# Patient Record
Sex: FEMALE | Race: WHITE
[De-identification: names, ages, dates, MRNs, and addresses within clinical notes are randomized per-mention and may not be internally consistent; named-entity substitution may affect disease eponyms.]

---

## 2019-03-29 ENCOUNTER — HOSPITAL ENCOUNTER (OUTPATIENT)
Dept: HOSPITAL 100 - ED | Age: 24
Setting detail: OBSERVATION
LOS: 3 days | Discharge: HOME | End: 2019-04-01
Payer: MEDICARE

## 2019-03-29 VITALS
HEART RATE: 118 BPM | DIASTOLIC BLOOD PRESSURE: 99 MMHG | RESPIRATION RATE: 18 BRPM | OXYGEN SATURATION: 98 % | TEMPERATURE: 100.58 F | SYSTOLIC BLOOD PRESSURE: 148 MMHG

## 2019-03-29 VITALS
TEMPERATURE: 99.6 F | SYSTOLIC BLOOD PRESSURE: 148 MMHG | DIASTOLIC BLOOD PRESSURE: 94 MMHG | OXYGEN SATURATION: 94 % | HEART RATE: 126 BPM | RESPIRATION RATE: 24 BRPM

## 2019-03-29 VITALS
HEART RATE: 136 BPM | SYSTOLIC BLOOD PRESSURE: 120 MMHG | OXYGEN SATURATION: 100 % | RESPIRATION RATE: 23 BRPM | DIASTOLIC BLOOD PRESSURE: 81 MMHG

## 2019-03-29 VITALS — OXYGEN SATURATION: 97 %

## 2019-03-29 VITALS
DIASTOLIC BLOOD PRESSURE: 81 MMHG | RESPIRATION RATE: 22 BRPM | SYSTOLIC BLOOD PRESSURE: 120 MMHG | OXYGEN SATURATION: 100 % | HEART RATE: 133 BPM | TEMPERATURE: 98.4 F

## 2019-03-29 VITALS — BODY MASS INDEX: 33.8 KG/M2

## 2019-03-29 VITALS — HEART RATE: 135 BPM | RESPIRATION RATE: 20 BRPM

## 2019-03-29 DIAGNOSIS — R06.89: ICD-10-CM

## 2019-03-29 DIAGNOSIS — J10.1: Primary | ICD-10-CM

## 2019-03-29 DIAGNOSIS — Z79.899: ICD-10-CM

## 2019-03-29 DIAGNOSIS — Q90.9: ICD-10-CM

## 2019-03-29 DIAGNOSIS — E03.9: ICD-10-CM

## 2019-03-29 LAB
ALANINE AMINOTRANSFER ALT/SGPT: 20 U/L (ref 13–56)
ALBUMIN SERPL-MCNC: 2.9 G/DL (ref 3.2–5)
ALKALINE PHOSPHATASE: 65 U/L (ref 45–117)
ANION GAP: 3 (ref 5–15)
APTT PPP: 39.3 SECONDS (ref 24.1–36.2)
AST(SGOT): 21 U/L (ref 15–37)
BUN SERPL-MCNC: 14 MG/DL (ref 7–18)
BUN/CREAT RATIO: 15 RATIO (ref 10–20)
CALCIUM SERPL-MCNC: 8.5 MG/DL (ref 8.5–10.1)
CARBON DIOXIDE: 29 MMOL/L (ref 21–32)
CHLORIDE: 112 MMOL/L (ref 98–107)
DEPRECATED RDW RBC: 52.9 FL (ref 35.1–43.9)
DIFFERENTIAL INDICATED: (no result)
ERYTHROCYTE [DISTWIDTH] IN BLOOD: 14.8 % (ref 11.6–14.6)
EST GLOM FILT RATE - AFR AMER: 93 ML/MIN (ref 60–?)
ESTIMATED CREATININE CLEARANCE: 122.09 ML/MIN
GLOBULIN: 4.6 G/DL (ref 2.2–4.2)
GLUCOSE: 112 MG/DL (ref 74–106)
HCT VFR BLD AUTO: 43 % (ref 37–47)
HEMOGLOBIN: 14.1 G/DL (ref 12–15)
HGB BLD-MCNC: 14.1 G/DL (ref 12–15)
IMMATURE GRANULOCYTES COUNT: 0.01 X10^3/UL (ref 0–0)
MCV RBC: 97.3 FL (ref 81–99)
MEAN CORP HGB CONC: 32.8 G/GL (ref 32–36)
MEAN PLATELET VOL.: 10 FL (ref 6.2–12)
PLATELET # BLD: 228 K/MM3 (ref 150–450)
PLATELET COUNT: 228 K/MM3 (ref 150–450)
POSITIVE COUNT: NO
POSITIVE DIFFERENTIAL: NO
POSITIVE MORPHOLOGY: NO
POTASSIUM: 3.9 MMOL/L (ref 3.5–5.1)
PROTHROMBIN TIME (PROTIME)PT.: 14.5 SECONDS (ref 11.7–14.9)
RBC # BLD AUTO: 4.42 M/MM3 (ref 4.2–5.4)
RBC DISTRIBUTION WIDTH CV: 14.8 % (ref 11.6–14.6)
RBC DISTRIBUTION WIDTH SD: 52.9 FL (ref 35.1–43.9)
WBC # BLD AUTO: 4 K/MM3 (ref 4.4–11)
WHITE BLOOD COUNT: 4 K/MM3 (ref 4.4–11)

## 2019-03-29 PROCEDURE — 80048 BASIC METABOLIC PNL TOTAL CA: CPT

## 2019-03-29 PROCEDURE — 85025 COMPLETE CBC W/AUTO DIFF WBC: CPT

## 2019-03-29 PROCEDURE — 87804 INFLUENZA ASSAY W/OPTIC: CPT

## 2019-03-29 PROCEDURE — 71046 X-RAY EXAM CHEST 2 VIEWS: CPT

## 2019-03-29 PROCEDURE — A4216 STERILE WATER/SALINE, 10 ML: HCPCS

## 2019-03-29 PROCEDURE — 85610 PROTHROMBIN TIME: CPT

## 2019-03-29 PROCEDURE — 94640 AIRWAY INHALATION TREATMENT: CPT

## 2019-03-29 PROCEDURE — 99285 EMERGENCY DEPT VISIT HI MDM: CPT

## 2019-03-29 PROCEDURE — 83605 ASSAY OF LACTIC ACID: CPT

## 2019-03-29 PROCEDURE — 85730 THROMBOPLASTIN TIME PARTIAL: CPT

## 2019-03-29 PROCEDURE — 96361 HYDRATE IV INFUSION ADD-ON: CPT

## 2019-03-29 PROCEDURE — G0378 HOSPITAL OBSERVATION PER HR: HCPCS

## 2019-03-29 PROCEDURE — 80053 COMPREHEN METABOLIC PANEL: CPT

## 2019-03-29 PROCEDURE — 87880 STREP A ASSAY W/OPTIC: CPT

## 2019-03-29 PROCEDURE — 87040 BLOOD CULTURE FOR BACTERIA: CPT

## 2019-03-29 PROCEDURE — 96360 HYDRATION IV INFUSION INIT: CPT

## 2019-03-29 PROCEDURE — 99218: CPT

## 2019-03-29 RX ADMIN — SODIUM CHLORIDE 999 ML: 9 INJECTION, SOLUTION INTRAVENOUS at 22:57

## 2019-03-30 VITALS
OXYGEN SATURATION: 97 % | TEMPERATURE: 97.88 F | DIASTOLIC BLOOD PRESSURE: 78 MMHG | SYSTOLIC BLOOD PRESSURE: 126 MMHG | HEART RATE: 105 BPM | RESPIRATION RATE: 20 BRPM

## 2019-03-30 VITALS
DIASTOLIC BLOOD PRESSURE: 91 MMHG | RESPIRATION RATE: 17 BRPM | OXYGEN SATURATION: 94 % | SYSTOLIC BLOOD PRESSURE: 127 MMHG | HEART RATE: 127 BPM | TEMPERATURE: 98.1 F

## 2019-03-30 VITALS
RESPIRATION RATE: 18 BRPM | OXYGEN SATURATION: 99 % | TEMPERATURE: 99.1 F | HEART RATE: 116 BPM | DIASTOLIC BLOOD PRESSURE: 70 MMHG | SYSTOLIC BLOOD PRESSURE: 102 MMHG

## 2019-03-30 VITALS
DIASTOLIC BLOOD PRESSURE: 75 MMHG | HEART RATE: 111 BPM | RESPIRATION RATE: 20 BRPM | OXYGEN SATURATION: 94 % | TEMPERATURE: 99.9 F | SYSTOLIC BLOOD PRESSURE: 145 MMHG

## 2019-03-30 VITALS
SYSTOLIC BLOOD PRESSURE: 117 MMHG | DIASTOLIC BLOOD PRESSURE: 87 MMHG | HEART RATE: 91 BPM | OXYGEN SATURATION: 98 % | TEMPERATURE: 98 F | RESPIRATION RATE: 20 BRPM

## 2019-03-30 VITALS
RESPIRATION RATE: 28 BRPM | OXYGEN SATURATION: 92 % | HEART RATE: 124 BPM | SYSTOLIC BLOOD PRESSURE: 121 MMHG | DIASTOLIC BLOOD PRESSURE: 71 MMHG

## 2019-03-30 VITALS
SYSTOLIC BLOOD PRESSURE: 113 MMHG | DIASTOLIC BLOOD PRESSURE: 58 MMHG | HEART RATE: 126 BPM | OXYGEN SATURATION: 96 % | RESPIRATION RATE: 19 BRPM | TEMPERATURE: 98.06 F

## 2019-03-30 VITALS — OXYGEN SATURATION: 94 % | RESPIRATION RATE: 20 BRPM

## 2019-03-30 VITALS
TEMPERATURE: 98.06 F | SYSTOLIC BLOOD PRESSURE: 113 MMHG | RESPIRATION RATE: 14 BRPM | OXYGEN SATURATION: 98 % | DIASTOLIC BLOOD PRESSURE: 58 MMHG | HEART RATE: 129 BPM

## 2019-03-30 VITALS — OXYGEN SATURATION: 98 % | RESPIRATION RATE: 20 BRPM

## 2019-03-30 VITALS — OXYGEN SATURATION: 92 %

## 2019-03-30 VITALS — TEMPERATURE: 98.06 F

## 2019-03-30 VITALS — RESPIRATION RATE: 21 BRPM | HEART RATE: 124 BPM

## 2019-03-30 VITALS — OXYGEN SATURATION: 96 %

## 2019-03-30 VITALS — RESPIRATION RATE: 20 BRPM

## 2019-03-30 VITALS — OXYGEN SATURATION: 99 %

## 2019-03-30 VITALS — TEMPERATURE: 101.2 F

## 2019-03-30 RX ADMIN — ALBUTEROL SULFATE 2.5 MG: 2.5 SOLUTION RESPIRATORY (INHALATION) at 01:41

## 2019-03-30 RX ADMIN — SODIUM CHLORIDE 100 ML: 9 INJECTION, SOLUTION INTRAVENOUS at 12:29

## 2019-03-30 RX ADMIN — SODIUM CHLORIDE 100 ML: 9 INJECTION, SOLUTION INTRAVENOUS at 03:26

## 2019-03-30 RX ADMIN — SODIUM CHLORIDE 100 ML: 9 INJECTION, SOLUTION INTRAVENOUS at 21:04

## 2019-03-30 RX ADMIN — OSELTAMIVIR PHOSPHATE 75 MG: 6 POWDER, FOR SUSPENSION ORAL at 01:02

## 2019-03-31 VITALS
TEMPERATURE: 98.3 F | SYSTOLIC BLOOD PRESSURE: 103 MMHG | OXYGEN SATURATION: 94 % | HEART RATE: 103 BPM | RESPIRATION RATE: 18 BRPM | DIASTOLIC BLOOD PRESSURE: 61 MMHG

## 2019-03-31 VITALS
OXYGEN SATURATION: 94 % | HEART RATE: 118 BPM | SYSTOLIC BLOOD PRESSURE: 119 MMHG | TEMPERATURE: 99.8 F | RESPIRATION RATE: 20 BRPM | DIASTOLIC BLOOD PRESSURE: 70 MMHG

## 2019-03-31 VITALS
TEMPERATURE: 98.7 F | SYSTOLIC BLOOD PRESSURE: 102 MMHG | HEART RATE: 88 BPM | RESPIRATION RATE: 18 BRPM | DIASTOLIC BLOOD PRESSURE: 61 MMHG | OXYGEN SATURATION: 97 %

## 2019-03-31 VITALS
HEART RATE: 98 BPM | DIASTOLIC BLOOD PRESSURE: 55 MMHG | TEMPERATURE: 97.88 F | RESPIRATION RATE: 18 BRPM | OXYGEN SATURATION: 94 % | SYSTOLIC BLOOD PRESSURE: 121 MMHG

## 2019-03-31 VITALS — TEMPERATURE: 100.7 F

## 2019-03-31 VITALS — OXYGEN SATURATION: 94 %

## 2019-03-31 LAB
ANION GAP: 3 (ref 5–15)
BUN SERPL-MCNC: 11 MG/DL (ref 7–18)
BUN/CREAT RATIO: 14.8 RATIO (ref 10–20)
CALCIUM SERPL-MCNC: 8 MG/DL (ref 8.5–10.1)
CARBON DIOXIDE: 27 MMOL/L (ref 21–32)
CHLORIDE: 108 MMOL/L (ref 98–107)
DEPRECATED RDW RBC: 53.1 FL (ref 35.1–43.9)
DIFFERENTIAL INDICATED: (no result)
ERYTHROCYTE [DISTWIDTH] IN BLOOD: 14.9 % (ref 11.6–14.6)
EST GLOM FILT RATE - AFR AMER: 122 ML/MIN (ref 60–?)
ESTIMATED CREATININE CLEARANCE: 95.3 ML/MIN
GLUCOSE: 92 MG/DL (ref 74–106)
HCT VFR BLD AUTO: 42.3 % (ref 37–47)
HEMOGLOBIN: 13.8 G/DL (ref 12–15)
HGB BLD-MCNC: 13.8 G/DL (ref 12–15)
IMMATURE GRANULOCYTES COUNT: 0.01 X10^3/UL (ref 0–0)
MCV RBC: 96.6 FL (ref 81–99)
MEAN CORP HGB CONC: 32.6 G/GL (ref 32–36)
MEAN PLATELET VOL.: 10.2 FL (ref 6.2–12)
PLATELET # BLD: 220 K/MM3 (ref 150–450)
PLATELET COUNT: 220 K/MM3 (ref 150–450)
POSITIVE COUNT: NO
POSITIVE DIFFERENTIAL: NO
POSITIVE MORPHOLOGY: NO
POTASSIUM: 3.9 MMOL/L (ref 3.5–5.1)
RBC # BLD AUTO: 4.38 M/MM3 (ref 4.2–5.4)
RBC DISTRIBUTION WIDTH CV: 14.9 % (ref 11.6–14.6)
RBC DISTRIBUTION WIDTH SD: 53.1 FL (ref 35.1–43.9)
WBC # BLD AUTO: 6.1 K/MM3 (ref 4.4–11)
WHITE BLOOD COUNT: 6.1 K/MM3 (ref 4.4–11)

## 2019-03-31 RX ADMIN — SODIUM CHLORIDE 100 ML: 9 INJECTION, SOLUTION INTRAVENOUS at 18:29

## 2019-03-31 RX ADMIN — SODIUM CHLORIDE, PRESERVATIVE FREE 0 ML: 5 INJECTION INTRAVENOUS at 08:10

## 2019-03-31 RX ADMIN — SODIUM CHLORIDE 100 ML: 9 INJECTION, SOLUTION INTRAVENOUS at 08:09

## 2019-04-01 VITALS
DIASTOLIC BLOOD PRESSURE: 71 MMHG | SYSTOLIC BLOOD PRESSURE: 98 MMHG | HEART RATE: 93 BPM | OXYGEN SATURATION: 99 % | RESPIRATION RATE: 18 BRPM | TEMPERATURE: 98.1 F

## 2019-04-01 VITALS
HEART RATE: 92 BPM | RESPIRATION RATE: 20 BRPM | DIASTOLIC BLOOD PRESSURE: 71 MMHG | TEMPERATURE: 98.24 F | OXYGEN SATURATION: 98 % | SYSTOLIC BLOOD PRESSURE: 111 MMHG

## 2019-04-01 VITALS
SYSTOLIC BLOOD PRESSURE: 106 MMHG | OXYGEN SATURATION: 98 % | TEMPERATURE: 98.4 F | HEART RATE: 94 BPM | RESPIRATION RATE: 16 BRPM | DIASTOLIC BLOOD PRESSURE: 65 MMHG

## 2019-04-01 RX ADMIN — SODIUM CHLORIDE 100 ML: 9 INJECTION, SOLUTION INTRAVENOUS at 04:49

## 2020-08-11 ENCOUNTER — HOSPITAL ENCOUNTER (OUTPATIENT)
Age: 25
End: 2020-08-11
Payer: MEDICARE

## 2020-08-11 VITALS — BODY MASS INDEX: 33.8 KG/M2

## 2020-08-11 DIAGNOSIS — M53.2X1: Primary | ICD-10-CM

## 2020-08-11 PROCEDURE — 72050 X-RAY EXAM NECK SPINE 4/5VWS: CPT

## 2022-03-08 ENCOUNTER — HOSPITAL ENCOUNTER (OUTPATIENT)
Dept: HOSPITAL 100 - BIMLAB | Age: 27
Discharge: HOME | End: 2022-03-08
Payer: MEDICARE

## 2022-03-08 DIAGNOSIS — E28.2: ICD-10-CM

## 2022-03-08 DIAGNOSIS — E06.3: ICD-10-CM

## 2022-03-08 DIAGNOSIS — R50.9: Primary | ICD-10-CM

## 2022-03-08 DIAGNOSIS — E03.8: ICD-10-CM

## 2022-03-08 DIAGNOSIS — E55.9: ICD-10-CM

## 2022-03-08 LAB
ALANINE AMINOTRANSFER ALT/SGPT: 19 U/L (ref 13–56)
ALBUMIN SERPL-MCNC: 3 G/DL (ref 3.2–5)
ALKALINE PHOSPHATASE: 88 U/L (ref 45–117)
ANION GAP: 6 (ref 5–15)
AST(SGOT): 12 U/L (ref 15–37)
BUN SERPL-MCNC: 13 MG/DL (ref 7–18)
BUN/CREAT RATIO: 18 RATIO (ref 10–20)
CALCIUM SERPL-MCNC: 8.8 MG/DL (ref 8.5–10.1)
CARBON DIOXIDE: 27 MMOL/L (ref 21–32)
CHLORIDE: 104 MMOL/L (ref 98–107)
CHOLEST SERPL-MCNC: 160 MG/DL
EST GLOM FILT RATE - AFR AMER: 123 ML/MIN (ref 60–?)
GLOBULIN: 4.8 G/DL (ref 2.2–4.2)
GLUCOSE: 100 MG/DL (ref 74–106)
POTASSIUM: 4.3 MMOL/L (ref 3.5–5.1)
TRIGLYCERIDES: 96 MG/DL
VITAMIN D,25 HYDROXY: 21.1 NG/ML
VLDLC SERPL-MCNC: 19 MG/DL (ref 5–40)

## 2022-03-08 PROCEDURE — 80053 COMPREHEN METABOLIC PANEL: CPT

## 2022-03-08 PROCEDURE — 82306 VITAMIN D 25 HYDROXY: CPT

## 2022-03-08 PROCEDURE — 84443 ASSAY THYROID STIM HORMONE: CPT

## 2022-03-08 PROCEDURE — 80061 LIPID PANEL: CPT

## 2022-03-08 PROCEDURE — 36415 COLL VENOUS BLD VENIPUNCTURE: CPT

## 2022-03-08 PROCEDURE — 84439 ASSAY OF FREE THYROXINE: CPT

## 2023-03-02 ENCOUNTER — HOSPITAL ENCOUNTER (OUTPATIENT)
Age: 28
Discharge: HOME | End: 2023-03-02
Payer: MEDICARE

## 2023-03-02 DIAGNOSIS — E28.2: ICD-10-CM

## 2023-03-02 DIAGNOSIS — E55.9: ICD-10-CM

## 2023-03-02 DIAGNOSIS — R50.9: ICD-10-CM

## 2023-03-02 DIAGNOSIS — E06.3: ICD-10-CM

## 2023-03-02 DIAGNOSIS — E03.8: Primary | ICD-10-CM

## 2023-03-02 LAB
ALANINE AMINOTRANSFER ALT/SGPT: 20 U/L (ref 13–56)
ALBUMIN SERPL-MCNC: 3.1 G/DL (ref 3.2–5)
ALKALINE PHOSPHATASE: 70 U/L (ref 45–117)
ANION GAP: 5 (ref 5–15)
AST(SGOT): 16 U/L (ref 15–37)
BUN SERPL-MCNC: 16 MG/DL (ref 7–18)
BUN/CREAT RATIO: 20.1 RATIO (ref 10–20)
CALCIUM SERPL-MCNC: 9.2 MG/DL (ref 8.5–10.1)
CARBON DIOXIDE: 31 MMOL/L (ref 21–32)
CHLORIDE: 103 MMOL/L (ref 98–107)
CHOLEST SERPL-MCNC: 168 MG/DL
EST GLOM FILT RATE - AFR AMER: 109 ML/MIN (ref 60–?)
GLOBULIN: 4.6 G/DL (ref 2.2–4.2)
GLUCOSE: 96 MG/DL (ref 74–106)
POTASSIUM: 4.1 MMOL/L (ref 3.5–5.1)
TRIGLYCERIDES: 110 MG/DL
VITAMIN D,25 HYDROXY: 75.4 NG/ML
VLDLC SERPL-MCNC: 22 MG/DL (ref 5–40)

## 2023-03-02 PROCEDURE — 84443 ASSAY THYROID STIM HORMONE: CPT

## 2023-03-02 PROCEDURE — 80053 COMPREHEN METABOLIC PANEL: CPT

## 2023-03-02 PROCEDURE — 84439 ASSAY OF FREE THYROXINE: CPT

## 2023-03-02 PROCEDURE — 82306 VITAMIN D 25 HYDROXY: CPT

## 2023-03-02 PROCEDURE — 36415 COLL VENOUS BLD VENIPUNCTURE: CPT

## 2023-03-02 PROCEDURE — 80061 LIPID PANEL: CPT

## 2023-06-20 LAB — URINE CULTURE: NORMAL

## 2023-10-25 ENCOUNTER — LAB (OUTPATIENT)
Dept: LAB | Facility: LAB | Age: 28
End: 2023-10-25
Payer: MEDICARE

## 2023-10-25 DIAGNOSIS — E55.9 VITAMIN D DEFICIENCY, UNSPECIFIED: Primary | ICD-10-CM

## 2023-10-25 DIAGNOSIS — E03.9 HYPOTHYROIDISM, UNSPECIFIED: ICD-10-CM

## 2023-10-25 LAB
25(OH)D3 SERPL-MCNC: 41 NG/ML (ref 30–100)
TSH SERPL-ACNC: 1.37 MIU/L (ref 0.44–3.98)

## 2023-10-25 PROCEDURE — 84443 ASSAY THYROID STIM HORMONE: CPT

## 2023-10-25 PROCEDURE — 82306 VITAMIN D 25 HYDROXY: CPT

## 2023-10-25 PROCEDURE — 36415 COLL VENOUS BLD VENIPUNCTURE: CPT

## 2024-01-04 ENCOUNTER — HOSPITAL ENCOUNTER (OUTPATIENT)
Dept: VASCULAR MEDICINE | Facility: HOSPITAL | Age: 29
Discharge: HOME | End: 2024-01-04
Payer: MEDICARE

## 2024-01-04 DIAGNOSIS — M79.604 PAIN IN RIGHT LEG: ICD-10-CM

## 2024-01-04 DIAGNOSIS — R60.0 LOCALIZED EDEMA: ICD-10-CM

## 2024-01-04 PROCEDURE — 93971 EXTREMITY STUDY: CPT | Performed by: STUDENT IN AN ORGANIZED HEALTH CARE EDUCATION/TRAINING PROGRAM

## 2024-01-04 PROCEDURE — 93971 EXTREMITY STUDY: CPT

## 2024-02-26 DIAGNOSIS — E55.9 VITAMIN D DEFICIENCY, UNSPECIFIED: Primary | ICD-10-CM

## 2024-02-26 DIAGNOSIS — E28.2 POLYCYSTIC OVARIAN SYNDROME: ICD-10-CM

## 2024-02-26 DIAGNOSIS — E03.8 OTHER SPECIFIED HYPOTHYROIDISM: ICD-10-CM

## 2024-02-26 DIAGNOSIS — E06.3 AUTOIMMUNE THYROIDITIS: ICD-10-CM

## 2024-02-28 ENCOUNTER — LAB (OUTPATIENT)
Dept: LAB | Facility: LAB | Age: 29
End: 2024-02-28
Payer: MEDICARE

## 2024-02-28 DIAGNOSIS — E06.3 AUTOIMMUNE THYROIDITIS: ICD-10-CM

## 2024-02-28 DIAGNOSIS — R53.83 OTHER FATIGUE: ICD-10-CM

## 2024-02-28 DIAGNOSIS — H10.9 UNSPECIFIED CONJUNCTIVITIS: ICD-10-CM

## 2024-02-28 DIAGNOSIS — E03.8 OTHER SPECIFIED HYPOTHYROIDISM: ICD-10-CM

## 2024-02-28 DIAGNOSIS — E28.2 POLYCYSTIC OVARIAN SYNDROME: ICD-10-CM

## 2024-02-28 DIAGNOSIS — J30.5 ALLERGIC RHINITIS DUE TO FOOD: ICD-10-CM

## 2024-02-28 DIAGNOSIS — F91.9 CONDUCT DISORDER, UNSPECIFIED: Primary | ICD-10-CM

## 2024-02-28 DIAGNOSIS — E55.9 VITAMIN D DEFICIENCY, UNSPECIFIED: ICD-10-CM

## 2024-02-28 LAB
25(OH)D3 SERPL-MCNC: 37 NG/ML (ref 30–100)
ALBUMIN SERPL BCP-MCNC: 3.5 G/DL (ref 3.4–5)
ALP SERPL-CCNC: 76 U/L (ref 33–110)
ALT SERPL W P-5'-P-CCNC: 11 U/L (ref 7–45)
ANION GAP SERPL CALC-SCNC: 9 MMOL/L (ref 10–20)
AST SERPL W P-5'-P-CCNC: 15 U/L (ref 9–39)
BASOPHILS # BLD AUTO: 0.07 X10*3/UL (ref 0–0.1)
BASOPHILS NFR BLD AUTO: 1.5 %
BILIRUB SERPL-MCNC: 0.4 MG/DL (ref 0–1.2)
BUN SERPL-MCNC: 16 MG/DL (ref 6–23)
CALCIUM SERPL-MCNC: 9 MG/DL (ref 8.6–10.3)
CHLORIDE SERPL-SCNC: 105 MMOL/L (ref 98–107)
CHOLEST SERPL-MCNC: 176 MG/DL (ref 0–199)
CHOLESTEROL/HDL RATIO: 5.3
CO2 SERPL-SCNC: 30 MMOL/L (ref 21–32)
CREAT SERPL-MCNC: 0.8 MG/DL (ref 0.5–1.05)
CRP SERPL-MCNC: 1.9 MG/DL
EGFRCR SERPLBLD CKD-EPI 2021: >90 ML/MIN/1.73M*2
EOSINOPHIL # BLD AUTO: 0.15 X10*3/UL (ref 0–0.7)
EOSINOPHIL NFR BLD AUTO: 3.2 %
ERYTHROCYTE [DISTWIDTH] IN BLOOD BY AUTOMATED COUNT: 13.8 % (ref 11.5–14.5)
EST. AVERAGE GLUCOSE BLD GHB EST-MCNC: 94 MG/DL
GLUCOSE SERPL-MCNC: 82 MG/DL (ref 74–99)
HBA1C MFR BLD: 4.9 %
HCT VFR BLD AUTO: 40.4 % (ref 36–46)
HDLC SERPL-MCNC: 33 MG/DL
HGB BLD-MCNC: 13.3 G/DL (ref 12–16)
IGA SERPL-MCNC: 358 MG/DL (ref 70–400)
IGE SERPL-ACNC: 6 IU/ML (ref 0–214)
IGG SERPL-MCNC: 1700 MG/DL (ref 700–1600)
IGM SERPL-MCNC: 41 MG/DL (ref 40–230)
IMM GRANULOCYTES # BLD AUTO: 0.01 X10*3/UL (ref 0–0.7)
IMM GRANULOCYTES NFR BLD AUTO: 0.2 % (ref 0–0.9)
LDLC SERPL CALC-MCNC: 129 MG/DL
LYMPHOCYTES # BLD AUTO: 1.39 X10*3/UL (ref 1.2–4.8)
LYMPHOCYTES NFR BLD AUTO: 29.3 %
MCH RBC QN AUTO: 32.7 PG (ref 26–34)
MCHC RBC AUTO-ENTMCNC: 32.9 G/DL (ref 32–36)
MCV RBC AUTO: 99 FL (ref 80–100)
MONOCYTES # BLD AUTO: 0.51 X10*3/UL (ref 0.1–1)
MONOCYTES NFR BLD AUTO: 10.8 %
NEUTROPHILS # BLD AUTO: 2.61 X10*3/UL (ref 1.2–7.7)
NEUTROPHILS NFR BLD AUTO: 55 %
NON HDL CHOLESTEROL: 143 MG/DL (ref 0–149)
NRBC BLD-RTO: 0 /100 WBCS (ref 0–0)
PLATELET # BLD AUTO: 329 X10*3/UL (ref 150–450)
POTASSIUM SERPL-SCNC: 4.3 MMOL/L (ref 3.5–5.3)
PROT SERPL-MCNC: 7 G/DL (ref 6.4–8.2)
RBC # BLD AUTO: 4.07 X10*6/UL (ref 4–5.2)
SODIUM SERPL-SCNC: 140 MMOL/L (ref 136–145)
T4 FREE SERPL-MCNC: 0.91 NG/DL (ref 0.61–1.12)
TRIGL SERPL-MCNC: 71 MG/DL (ref 0–149)
TSH SERPL-ACNC: 1.24 MIU/L (ref 0.44–3.98)
VLDL: 14 MG/DL (ref 0–40)
WBC # BLD AUTO: 4.7 X10*3/UL (ref 4.4–11.3)

## 2024-02-28 PROCEDURE — 83036 HEMOGLOBIN GLYCOSYLATED A1C: CPT

## 2024-02-28 PROCEDURE — 80053 COMPREHEN METABOLIC PANEL: CPT

## 2024-02-28 PROCEDURE — 82784 ASSAY IGA/IGD/IGG/IGM EACH: CPT

## 2024-02-28 PROCEDURE — 85025 COMPLETE CBC W/AUTO DIFF WBC: CPT

## 2024-02-28 PROCEDURE — 86140 C-REACTIVE PROTEIN: CPT

## 2024-02-28 PROCEDURE — 86003 ALLG SPEC IGE CRUDE XTRC EA: CPT

## 2024-02-28 PROCEDURE — 80061 LIPID PANEL: CPT

## 2024-02-28 PROCEDURE — 82785 ASSAY OF IGE: CPT

## 2024-02-28 PROCEDURE — 84439 ASSAY OF FREE THYROXINE: CPT

## 2024-02-28 PROCEDURE — 82306 VITAMIN D 25 HYDROXY: CPT

## 2024-02-28 PROCEDURE — 36415 COLL VENOUS BLD VENIPUNCTURE: CPT

## 2024-02-28 PROCEDURE — 84443 ASSAY THYROID STIM HORMONE: CPT

## 2024-02-29 LAB
CLAM IGE QN: <0.1 KU/L
CODFISH IGE QN: <0.1 KU/L
CORN IGE QN: <0.1
EGG WHITE IGE QN: <0.1 KU/L
MILK IGE QN: <0.1 KU/L
PEANUT IGE QN: <0.1 KU/L
SCALLOP IGE QN: <0.1 KU/L
SESAME SEED IGE QN: <0.1 KU/L
SHRIMP IGE QN: <0.1 KU/L
SOYBEAN IGE QN: <0.1 KU/L
WALNUT IGE QN: <0.1 KU/L
WHEAT IGE QN: <0.1 KU/L

## 2024-03-01 DIAGNOSIS — H10.9 UNSPECIFIED CONJUNCTIVITIS: Primary | ICD-10-CM

## 2024-03-01 LAB — IGD SER-MCNC: 7.6 MG/DL

## 2024-03-08 ENCOUNTER — HOSPITAL ENCOUNTER (OUTPATIENT)
Dept: HOSPITAL 100 - LABSPEC | Age: 29
Discharge: HOME | End: 2024-03-08
Payer: MEDICARE

## 2024-03-08 DIAGNOSIS — R55: Primary | ICD-10-CM

## 2024-03-08 LAB
ALANINE AMINOTRANSFER ALT/SGPT: 16 U/L (ref 13–56)
ALBUMIN SERPL-MCNC: 2.7 G/DL (ref 3.2–5)
ALKALINE PHOSPHATASE: 83 U/L (ref 45–117)
ANION GAP: 2 (ref 5–15)
AST(SGOT): 16 U/L (ref 15–37)
BUN SERPL-MCNC: 15 MG/DL (ref 7–18)
BUN/CREAT RATIO: 20.4 RATIO (ref 10–20)
CALCIUM SERPL-MCNC: 8.7 MG/DL (ref 8.5–10.1)
CARBON DIOXIDE: 30 MMOL/L (ref 21–32)
CHLORIDE: 109 MMOL/L (ref 98–107)
DEPRECATED RDW RBC: 52.8 FL (ref 35.1–43.9)
ERYTHROCYTE [DISTWIDTH] IN BLOOD: 14.1 % (ref 11.6–14.6)
EST GLOM FILT RATE - AFR AMER: 119 ML/MIN (ref 60–?)
GLOBULIN: 4.3 G/DL (ref 2.2–4.2)
GLUCOSE: 66 MG/DL (ref 74–106)
HCT VFR BLD AUTO: 44.2 % (ref 37–47)
HGB BLD-MCNC: 14.1 G/DL (ref 12–15)
IMMATURE GRANULOCYTES COUNT: 0.04 X10^3/UL (ref 0–0)
MCV RBC: 101.1 FL (ref 81–99)
MEAN CORP HGB CONC: 31.9 G/DL (ref 32–36)
MEAN PLATELET VOL.: 9.8 FL (ref 6.2–12)
NRBC FLAGGED BY ANALYZER: 0 % (ref 0–5)
PLATELET # BLD: 303 K/MM3 (ref 150–450)
POTASSIUM: 4.4 MMOL/L (ref 3.5–5.1)
RBC # BLD AUTO: 4.37 M/MM3 (ref 4.2–5.4)
TROPONIN-I HS: 3 PG/ML (ref 3–54)
WBC # BLD AUTO: 4.7 K/MM3 (ref 4.4–11)

## 2024-03-08 PROCEDURE — 82607 VITAMIN B-12: CPT

## 2024-03-08 PROCEDURE — 84484 ASSAY OF TROPONIN QUANT: CPT

## 2024-03-08 PROCEDURE — 85025 COMPLETE CBC W/AUTO DIFF WBC: CPT

## 2024-03-08 PROCEDURE — 80053 COMPREHEN METABOLIC PANEL: CPT

## 2024-03-08 PROCEDURE — 82746 ASSAY OF FOLIC ACID SERUM: CPT

## 2024-03-19 DIAGNOSIS — I95.1 ORTHOSTATIC HYPOTENSION: Primary | ICD-10-CM

## 2024-06-21 ENCOUNTER — HOSPITAL ENCOUNTER (OUTPATIENT)
Dept: HOSPITAL 100 - SL | Age: 29
Discharge: HOME | End: 2024-06-21
Payer: MEDICARE

## 2024-06-21 DIAGNOSIS — G47.10: Primary | ICD-10-CM

## 2024-06-21 PROCEDURE — 95810 POLYSOM 6/> YRS 4/> PARAM: CPT

## 2024-07-26 ENCOUNTER — HOSPITAL ENCOUNTER (OUTPATIENT)
Dept: HOSPITAL 100 - SL | Age: 29
Discharge: HOME | End: 2024-07-26
Payer: MEDICARE

## 2024-07-26 DIAGNOSIS — G47.33: Primary | ICD-10-CM

## 2024-07-26 PROCEDURE — 95811 POLYSOM 6/>YRS CPAP 4/> PARM: CPT

## 2024-09-20 ENCOUNTER — HOSPITAL ENCOUNTER (OUTPATIENT)
Dept: HOSPITAL 100 - NM | Age: 29
Discharge: HOME | End: 2024-09-20
Payer: MEDICARE

## 2024-09-20 DIAGNOSIS — M54.50: Primary | ICD-10-CM

## 2024-09-20 PROCEDURE — 78300 BONE IMAGING LIMITED AREA: CPT

## 2024-09-20 PROCEDURE — A9503 TC99M MEDRONATE: HCPCS

## 2024-10-04 ENCOUNTER — HOSPITAL ENCOUNTER (OUTPATIENT)
Age: 29
Discharge: HOME | End: 2024-10-04
Payer: MEDICARE

## 2024-10-04 DIAGNOSIS — R10.9: Primary | ICD-10-CM

## 2024-10-04 PROCEDURE — A4216 STERILE WATER/SALINE, 10 ML: HCPCS

## 2024-10-04 PROCEDURE — 74178 CT ABD&PLV WO CNTR FLWD CNTR: CPT

## 2024-10-15 ENCOUNTER — HOSPITAL ENCOUNTER (OUTPATIENT)
Dept: HOSPITAL 100 - PT | Age: 29
Discharge: HOME | End: 2024-10-15
Payer: MEDICARE

## 2024-10-15 DIAGNOSIS — M54.50: Primary | ICD-10-CM

## 2024-10-15 PROCEDURE — 97113 AQUATIC THERAPY/EXERCISES: CPT

## 2024-10-15 PROCEDURE — 97161 PT EVAL LOW COMPLEX 20 MIN: CPT

## 2024-11-12 ENCOUNTER — APPOINTMENT (OUTPATIENT)
Dept: RADIOLOGY | Facility: HOSPITAL | Age: 29
DRG: 559 | End: 2024-11-12
Payer: MEDICARE

## 2024-11-12 ENCOUNTER — HOSPITAL ENCOUNTER (INPATIENT)
Facility: HOSPITAL | Age: 29
DRG: 559 | End: 2024-11-12
Attending: HOSPITALIST | Admitting: HOSPITALIST
Payer: MEDICARE

## 2024-11-12 DIAGNOSIS — R26.2 INABILITY TO AMBULATE DUE TO HIP: ICD-10-CM

## 2024-11-12 DIAGNOSIS — R33.8 ACUTE URINARY RETENTION: Primary | ICD-10-CM

## 2024-11-12 DIAGNOSIS — M96.842 POSTOPERATIVE SEROMA OF MUSCULOSKELETAL STRUCTURE AFTER MUSCULOSKELETAL PROCEDURE: ICD-10-CM

## 2024-11-12 PROBLEM — R53.1 WEAKNESS: Status: ACTIVE | Noted: 2024-11-12

## 2024-11-12 LAB
ALBUMIN SERPL BCP-MCNC: 3.1 G/DL (ref 3.4–5)
ALP SERPL-CCNC: 65 U/L (ref 33–110)
ALT SERPL W P-5'-P-CCNC: 22 U/L (ref 7–45)
ANION GAP SERPL CALC-SCNC: 13 MMOL/L (ref 10–20)
APPEARANCE UR: CLEAR
AST SERPL W P-5'-P-CCNC: 20 U/L (ref 9–39)
BILIRUB SERPL-MCNC: 0.5 MG/DL (ref 0–1.2)
BILIRUB UR STRIP.AUTO-MCNC: NEGATIVE MG/DL
BUN SERPL-MCNC: 12 MG/DL (ref 6–23)
CALCIUM SERPL-MCNC: 9.1 MG/DL (ref 8.6–10.3)
CAOX CRY #/AREA UR COMP ASSIST: NORMAL /HPF
CHLORIDE SERPL-SCNC: 104 MMOL/L (ref 98–107)
CO2 SERPL-SCNC: 26 MMOL/L (ref 21–32)
COLOR UR: YELLOW
CREAT SERPL-MCNC: 0.74 MG/DL (ref 0.5–1.05)
EGFRCR SERPLBLD CKD-EPI 2021: >90 ML/MIN/1.73M*2
ERYTHROCYTE [DISTWIDTH] IN BLOOD BY AUTOMATED COUNT: 14.3 % (ref 11.5–14.5)
FLUAV RNA RESP QL NAA+PROBE: NOT DETECTED
FLUBV RNA RESP QL NAA+PROBE: NOT DETECTED
GLUCOSE SERPL-MCNC: 81 MG/DL (ref 74–99)
GLUCOSE UR STRIP.AUTO-MCNC: NORMAL MG/DL
HCT VFR BLD AUTO: 35.7 % (ref 36–46)
HGB BLD-MCNC: 11.4 G/DL (ref 12–16)
HOLD SPECIMEN: NORMAL
HOLD SPECIMEN: NORMAL
IRON SATN MFR SERPL: 9 % (ref 25–45)
IRON SERPL-MCNC: 15 UG/DL (ref 35–150)
KETONES UR STRIP.AUTO-MCNC: ABNORMAL MG/DL
LACTATE SERPL-SCNC: 1.6 MMOL/L (ref 0.4–2)
LEUKOCYTE ESTERASE UR QL STRIP.AUTO: NEGATIVE
MCH RBC QN AUTO: 32.6 PG (ref 26–34)
MCHC RBC AUTO-ENTMCNC: 31.9 G/DL (ref 32–36)
MCV RBC AUTO: 102 FL (ref 80–100)
MUCOUS THREADS #/AREA URNS AUTO: NORMAL /LPF
NITRITE UR QL STRIP.AUTO: NEGATIVE
NRBC BLD-RTO: 0 /100 WBCS (ref 0–0)
PH UR STRIP.AUTO: 6.5 [PH]
PLATELET # BLD AUTO: 520 X10*3/UL (ref 150–450)
POTASSIUM SERPL-SCNC: 3.8 MMOL/L (ref 3.5–5.3)
PROT SERPL-MCNC: 6.8 G/DL (ref 6.4–8.2)
PROT UR STRIP.AUTO-MCNC: ABNORMAL MG/DL
RBC # BLD AUTO: 3.5 X10*6/UL (ref 4–5.2)
RBC # UR STRIP.AUTO: NEGATIVE /UL
RBC #/AREA URNS AUTO: NORMAL /HPF
SARS-COV-2 RNA RESP QL NAA+PROBE: NOT DETECTED
SODIUM SERPL-SCNC: 139 MMOL/L (ref 136–145)
SP GR UR STRIP.AUTO: 1.03
TIBC SERPL-MCNC: 163 UG/DL (ref 240–445)
UIBC SERPL-MCNC: 148 UG/DL (ref 110–370)
UROBILINOGEN UR STRIP.AUTO-MCNC: NORMAL MG/DL
WBC # BLD AUTO: 8.3 X10*3/UL (ref 4.4–11.3)
WBC #/AREA URNS AUTO: NORMAL /HPF

## 2024-11-12 PROCEDURE — 2500000005 HC RX 250 GENERAL PHARMACY W/O HCPCS: Performed by: HOSPITALIST

## 2024-11-12 PROCEDURE — 87040 BLOOD CULTURE FOR BACTERIA: CPT | Mod: SAMLAB | Performed by: PHYSICIAN ASSISTANT

## 2024-11-12 PROCEDURE — 83540 ASSAY OF IRON: CPT | Performed by: HOSPITALIST

## 2024-11-12 PROCEDURE — 2500000002 HC RX 250 W HCPCS SELF ADMINISTERED DRUGS (ALT 637 FOR MEDICARE OP, ALT 636 FOR OP/ED): Performed by: PHYSICIAN ASSISTANT

## 2024-11-12 PROCEDURE — 99223 1ST HOSP IP/OBS HIGH 75: CPT | Performed by: HOSPITALIST

## 2024-11-12 PROCEDURE — 84075 ASSAY ALKALINE PHOSPHATASE: CPT | Performed by: PHYSICIAN ASSISTANT

## 2024-11-12 PROCEDURE — 71045 X-RAY EXAM CHEST 1 VIEW: CPT

## 2024-11-12 PROCEDURE — 83605 ASSAY OF LACTIC ACID: CPT | Performed by: PHYSICIAN ASSISTANT

## 2024-11-12 PROCEDURE — 36415 COLL VENOUS BLD VENIPUNCTURE: CPT | Performed by: PHYSICIAN ASSISTANT

## 2024-11-12 PROCEDURE — 87502 INFLUENZA DNA AMP PROBE: CPT | Performed by: PHYSICIAN ASSISTANT

## 2024-11-12 PROCEDURE — 2500000001 HC RX 250 WO HCPCS SELF ADMINISTERED DRUGS (ALT 637 FOR MEDICARE OP): Performed by: HOSPITALIST

## 2024-11-12 PROCEDURE — 51702 INSERT TEMP BLADDER CATH: CPT

## 2024-11-12 PROCEDURE — G0378 HOSPITAL OBSERVATION PER HR: HCPCS

## 2024-11-12 PROCEDURE — 85027 COMPLETE CBC AUTOMATED: CPT | Performed by: PHYSICIAN ASSISTANT

## 2024-11-12 PROCEDURE — 74177 CT ABD & PELVIS W/CONTRAST: CPT

## 2024-11-12 PROCEDURE — 72170 X-RAY EXAM OF PELVIS: CPT | Mod: FOREIGN READ | Performed by: RADIOLOGY

## 2024-11-12 PROCEDURE — 2500000002 HC RX 250 W HCPCS SELF ADMINISTERED DRUGS (ALT 637 FOR MEDICARE OP, ALT 636 FOR OP/ED): Performed by: HOSPITALIST

## 2024-11-12 PROCEDURE — 82607 VITAMIN B-12: CPT | Mod: SAMLAB | Performed by: HOSPITALIST

## 2024-11-12 PROCEDURE — 2500000004 HC RX 250 GENERAL PHARMACY W/ HCPCS (ALT 636 FOR OP/ED): Performed by: HOSPITALIST

## 2024-11-12 PROCEDURE — 99285 EMERGENCY DEPT VISIT HI MDM: CPT | Mod: 25

## 2024-11-12 PROCEDURE — 81001 URINALYSIS AUTO W/SCOPE: CPT | Performed by: PHYSICIAN ASSISTANT

## 2024-11-12 PROCEDURE — 71045 X-RAY EXAM CHEST 1 VIEW: CPT | Mod: FOREIGN READ | Performed by: RADIOLOGY

## 2024-11-12 PROCEDURE — 72170 X-RAY EXAM OF PELVIS: CPT

## 2024-11-12 PROCEDURE — 82746 ASSAY OF FOLIC ACID SERUM: CPT | Mod: SAMLAB | Performed by: HOSPITALIST

## 2024-11-12 PROCEDURE — 2550000001 HC RX 255 CONTRASTS: Performed by: PHYSICIAN ASSISTANT

## 2024-11-12 PROCEDURE — 96372 THER/PROPH/DIAG INJ SC/IM: CPT | Performed by: HOSPITALIST

## 2024-11-12 RX ORDER — SULFAMETHOXAZOLE AND TRIMETHOPRIM 800; 160 MG/1; MG/1
1 TABLET ORAL 2 TIMES DAILY
Status: DISCONTINUED | OUTPATIENT
Start: 2024-11-12 | End: 2024-11-17

## 2024-11-12 RX ORDER — OLOPATADINE HYDROCHLORIDE 1 MG/ML
1 SOLUTION/ DROPS OPHTHALMIC 2 TIMES DAILY
COMMUNITY

## 2024-11-12 RX ORDER — AMMONIUM LACTATE 12 G/100G
1 CREAM TOPICAL 2 TIMES DAILY
COMMUNITY

## 2024-11-12 RX ORDER — FLUTICASONE PROPIONATE 50 MCG
2 SPRAY, SUSPENSION (ML) NASAL EVERY MORNING
COMMUNITY

## 2024-11-12 RX ORDER — AMMONIUM LACTATE 12 G/100G
1 CREAM TOPICAL 2 TIMES DAILY
Status: DISCONTINUED | OUTPATIENT
Start: 2024-11-12 | End: 2024-11-19 | Stop reason: HOSPADM

## 2024-11-12 RX ORDER — ASPIRIN 325 MG
325 TABLET ORAL 2 TIMES DAILY
Status: ON HOLD | COMMUNITY
Start: 2024-11-01 | End: 2024-11-18

## 2024-11-12 RX ORDER — IBUPROFEN 400 MG/1
400 TABLET ORAL 3 TIMES DAILY
COMMUNITY
End: 2024-11-19 | Stop reason: HOSPADM

## 2024-11-12 RX ORDER — ACETAMINOPHEN 650 MG/1
650 SUPPOSITORY RECTAL EVERY 4 HOURS PRN
Status: DISCONTINUED | OUTPATIENT
Start: 2024-11-12 | End: 2024-11-19 | Stop reason: HOSPADM

## 2024-11-12 RX ORDER — TEMAZEPAM 15 MG/1
CAPSULE ORAL
COMMUNITY

## 2024-11-12 RX ORDER — ONDANSETRON HYDROCHLORIDE 2 MG/ML
4 INJECTION, SOLUTION INTRAVENOUS EVERY 8 HOURS PRN
Status: DISCONTINUED | OUTPATIENT
Start: 2024-11-12 | End: 2024-11-19 | Stop reason: HOSPADM

## 2024-11-12 RX ORDER — FLUTICASONE PROPIONATE 50 MCG
2 SPRAY, SUSPENSION (ML) NASAL EVERY MORNING
Status: DISCONTINUED | OUTPATIENT
Start: 2024-11-13 | End: 2024-11-19 | Stop reason: HOSPADM

## 2024-11-12 RX ORDER — ARIPIPRAZOLE 5 MG/1
5 TABLET ORAL EVERY MORNING
COMMUNITY

## 2024-11-12 RX ORDER — DULOXETIN HYDROCHLORIDE 60 MG/1
60 CAPSULE, DELAYED RELEASE ORAL EVERY MORNING
Status: DISCONTINUED | OUTPATIENT
Start: 2024-11-13 | End: 2024-11-19 | Stop reason: HOSPADM

## 2024-11-12 RX ORDER — DULOXETIN HYDROCHLORIDE 60 MG/1
60 CAPSULE, DELAYED RELEASE ORAL EVERY MORNING
COMMUNITY

## 2024-11-12 RX ORDER — OXYBUTYNIN CHLORIDE 5 MG/1
2.5 TABLET ORAL 2 TIMES DAILY
Status: DISCONTINUED | OUTPATIENT
Start: 2024-11-12 | End: 2024-11-12

## 2024-11-12 RX ORDER — CLOBETASOL PROPIONATE 0.05 G/100ML
1 SHAMPOO TOPICAL EVERY MORNING
COMMUNITY

## 2024-11-12 RX ORDER — ACETAMINOPHEN 325 MG/1
650 TABLET ORAL EVERY 4 HOURS PRN
Status: DISCONTINUED | OUTPATIENT
Start: 2024-11-12 | End: 2024-11-19 | Stop reason: HOSPADM

## 2024-11-12 RX ORDER — ONDANSETRON 4 MG/1
4 TABLET, FILM COATED ORAL EVERY 8 HOURS PRN
Status: DISCONTINUED | OUTPATIENT
Start: 2024-11-12 | End: 2024-11-12 | Stop reason: CLARIF

## 2024-11-12 RX ORDER — TRAMADOL HYDROCHLORIDE 50 MG/1
50 TABLET ORAL EVERY 6 HOURS PRN
COMMUNITY

## 2024-11-12 RX ORDER — ASPIRIN 325 MG
325 TABLET ORAL 2 TIMES DAILY
Status: DISCONTINUED | OUTPATIENT
Start: 2024-11-12 | End: 2024-11-19 | Stop reason: HOSPADM

## 2024-11-12 RX ORDER — POLYETHYLENE GLYCOL 3350 17 G/17G
17 POWDER, FOR SOLUTION ORAL DAILY
Status: DISCONTINUED | OUTPATIENT
Start: 2024-11-12 | End: 2024-11-19 | Stop reason: HOSPADM

## 2024-11-12 RX ORDER — LEVOTHYROXINE SODIUM 100 UG/1
100 TABLET ORAL EVERY MORNING
COMMUNITY

## 2024-11-12 RX ORDER — LORAZEPAM 1 MG/1
1 TABLET ORAL NIGHTLY
Status: DISCONTINUED | OUTPATIENT
Start: 2024-11-12 | End: 2024-11-19 | Stop reason: HOSPADM

## 2024-11-12 RX ORDER — ONDANSETRON 4 MG/1
4 TABLET, ORALLY DISINTEGRATING ORAL EVERY 8 HOURS PRN
Status: DISCONTINUED | OUTPATIENT
Start: 2024-11-12 | End: 2024-11-19 | Stop reason: HOSPADM

## 2024-11-12 RX ORDER — TRAMADOL HYDROCHLORIDE 50 MG/1
50 TABLET ORAL EVERY 6 HOURS PRN
Status: DISCONTINUED | OUTPATIENT
Start: 2024-11-12 | End: 2024-11-19 | Stop reason: HOSPADM

## 2024-11-12 RX ORDER — CELECOXIB 200 MG/1
200 CAPSULE ORAL EVERY EVENING
COMMUNITY

## 2024-11-12 RX ORDER — ACETAMINOPHEN 160 MG/5ML
650 SOLUTION ORAL EVERY 4 HOURS PRN
Status: DISCONTINUED | OUTPATIENT
Start: 2024-11-12 | End: 2024-11-19 | Stop reason: HOSPADM

## 2024-11-12 RX ORDER — LIDOCAINE 560 MG/1
1 PATCH PERCUTANEOUS; TOPICAL; TRANSDERMAL DAILY
Status: DISCONTINUED | OUTPATIENT
Start: 2024-11-12 | End: 2024-11-19 | Stop reason: HOSPADM

## 2024-11-12 RX ORDER — LEVOTHYROXINE SODIUM 100 UG/1
100 TABLET ORAL EVERY MORNING
Status: DISCONTINUED | OUTPATIENT
Start: 2024-11-13 | End: 2024-11-18

## 2024-11-12 RX ORDER — ARIPIPRAZOLE 5 MG/1
5 TABLET ORAL EVERY MORNING
Status: DISCONTINUED | OUTPATIENT
Start: 2024-11-13 | End: 2024-11-19 | Stop reason: HOSPADM

## 2024-11-12 RX ORDER — MONTELUKAST SODIUM 10 MG/1
10 TABLET ORAL EVERY MORNING
Status: DISCONTINUED | OUTPATIENT
Start: 2024-11-13 | End: 2024-11-19 | Stop reason: HOSPADM

## 2024-11-12 RX ORDER — MONTELUKAST SODIUM 10 MG/1
10 TABLET ORAL EVERY MORNING
COMMUNITY

## 2024-11-12 RX ORDER — ENOXAPARIN SODIUM 100 MG/ML
40 INJECTION SUBCUTANEOUS EVERY 24 HOURS
Status: DISCONTINUED | OUTPATIENT
Start: 2024-11-12 | End: 2024-11-19 | Stop reason: HOSPADM

## 2024-11-12 RX ORDER — TEMAZEPAM 15 MG/1
15 CAPSULE ORAL NIGHTLY
Status: DISCONTINUED | OUTPATIENT
Start: 2024-11-12 | End: 2024-11-19 | Stop reason: HOSPADM

## 2024-11-12 RX ORDER — LORAZEPAM 1 MG/1
1 TABLET ORAL NIGHTLY
COMMUNITY

## 2024-11-12 RX ORDER — SULFAMETHOXAZOLE AND TRIMETHOPRIM 800; 160 MG/1; MG/1
1 TABLET ORAL 2 TIMES DAILY
COMMUNITY
Start: 2024-11-12 | End: 2024-11-19 | Stop reason: HOSPADM

## 2024-11-12 RX ORDER — LEVOCETIRIZINE DIHYDROCHLORIDE 5 MG/1
5 TABLET, FILM COATED ORAL EVERY MORNING
COMMUNITY

## 2024-11-12 RX ORDER — IRON POLYSACCHARIDE COMPLEX 150 MG
150 CAPSULE ORAL DAILY
Status: DISCONTINUED | OUTPATIENT
Start: 2024-11-12 | End: 2024-11-13

## 2024-11-12 SDOH — SOCIAL STABILITY: SOCIAL INSECURITY
WITHIN THE LAST YEAR, HAVE YOU BEEN RAPED OR FORCED TO HAVE ANY KIND OF SEXUAL ACTIVITY BY YOUR PARTNER OR EX-PARTNER?: NO

## 2024-11-12 SDOH — SOCIAL STABILITY: SOCIAL INSECURITY: ARE THERE ANY APPARENT SIGNS OF INJURIES/BEHAVIORS THAT COULD BE RELATED TO ABUSE/NEGLECT?: UNABLE TO ASSESS

## 2024-11-12 SDOH — SOCIAL STABILITY: SOCIAL INSECURITY: WERE YOU ABLE TO COMPLETE ALL THE BEHAVIORAL HEALTH SCREENINGS?: NO

## 2024-11-12 SDOH — SOCIAL STABILITY: SOCIAL INSECURITY: DOES ANYONE TRY TO KEEP YOU FROM HAVING/CONTACTING OTHER FRIENDS OR DOING THINGS OUTSIDE YOUR HOME?: NO

## 2024-11-12 SDOH — SOCIAL STABILITY: SOCIAL INSECURITY: DO YOU FEEL UNSAFE GOING BACK TO THE PLACE WHERE YOU ARE LIVING?: YES

## 2024-11-12 SDOH — SOCIAL STABILITY: SOCIAL INSECURITY: ARE YOU OR HAVE YOU BEEN THREATENED OR ABUSED PHYSICALLY, EMOTIONALLY, OR SEXUALLY BY ANYONE?: YES

## 2024-11-12 SDOH — SOCIAL STABILITY: SOCIAL INSECURITY: WITHIN THE LAST YEAR, HAVE YOU BEEN AFRAID OF YOUR PARTNER OR EX-PARTNER?: NO

## 2024-11-12 SDOH — SOCIAL STABILITY: SOCIAL INSECURITY: HAS ANYONE EVER THREATENED TO HURT YOUR FAMILY OR YOUR PETS?: UNABLE TO ASSESS

## 2024-11-12 SDOH — SOCIAL STABILITY: SOCIAL INSECURITY: WERE YOU ABLE TO COMPLETE ALL THE BEHAVIORAL HEALTH SCREENINGS?: YES

## 2024-11-12 SDOH — SOCIAL STABILITY: SOCIAL INSECURITY: DO YOU FEEL ANYONE HAS EXPLOITED OR TAKEN ADVANTAGE OF YOU FINANCIALLY OR OF YOUR PERSONAL PROPERTY?: UNABLE TO ASSESS

## 2024-11-12 SDOH — SOCIAL STABILITY: SOCIAL INSECURITY: HAVE YOU HAD ANY THOUGHTS OF HARMING ANYONE ELSE?: UNABLE TO ASSESS

## 2024-11-12 SDOH — SOCIAL STABILITY: SOCIAL INSECURITY: WITHIN THE LAST YEAR, HAVE YOU BEEN HUMILIATED OR EMOTIONALLY ABUSED IN OTHER WAYS BY YOUR PARTNER OR EX-PARTNER?: NO

## 2024-11-12 SDOH — ECONOMIC STABILITY: INCOME INSECURITY: IN THE PAST 12 MONTHS HAS THE ELECTRIC, GAS, OIL, OR WATER COMPANY THREATENED TO SHUT OFF SERVICES IN YOUR HOME?: NO

## 2024-11-12 SDOH — SOCIAL STABILITY: SOCIAL INSECURITY
WITHIN THE LAST YEAR, HAVE YOU BEEN KICKED, HIT, SLAPPED, OR OTHERWISE PHYSICALLY HURT BY YOUR PARTNER OR EX-PARTNER?: NO

## 2024-11-12 SDOH — SOCIAL STABILITY: SOCIAL INSECURITY: ABUSE: ADULT

## 2024-11-12 SDOH — SOCIAL STABILITY: SOCIAL INSECURITY: HAVE YOU HAD THOUGHTS OF HARMING ANYONE ELSE?: YES

## 2024-11-12 ASSESSMENT — COGNITIVE AND FUNCTIONAL STATUS - GENERAL
DAILY ACTIVITIY SCORE: 16
MOVING TO AND FROM BED TO CHAIR: TOTAL
CLIMB 3 TO 5 STEPS WITH RAILING: TOTAL
DRESSING REGULAR UPPER BODY CLOTHING: A LITTLE
PATIENT BASELINE BEDBOUND: NO
HELP NEEDED FOR BATHING: A LITTLE
DRESSING REGULAR LOWER BODY CLOTHING: A LITTLE
MOBILITY SCORE: 12
PERSONAL GROOMING: A LITTLE
WALKING IN HOSPITAL ROOM: TOTAL
EATING MEALS: A LITTLE
TOILETING: TOTAL
STANDING UP FROM CHAIR USING ARMS: TOTAL

## 2024-11-12 ASSESSMENT — COLUMBIA-SUICIDE SEVERITY RATING SCALE - C-SSRS
1. IN THE PAST MONTH, HAVE YOU WISHED YOU WERE DEAD OR WISHED YOU COULD GO TO SLEEP AND NOT WAKE UP?: NO
2. HAVE YOU ACTUALLY HAD ANY THOUGHTS OF KILLING YOURSELF?: NO
6. HAVE YOU EVER DONE ANYTHING, STARTED TO DO ANYTHING, OR PREPARED TO DO ANYTHING TO END YOUR LIFE?: NO

## 2024-11-12 ASSESSMENT — ACTIVITIES OF DAILY LIVING (ADL)
HEARING - LEFT EAR: FUNCTIONAL
WALKS IN HOME: DEPENDENT
TOILETING: DEPENDENT
ADEQUATE_TO_COMPLETE_ADL: NO
JUDGMENT_ADEQUATE_SAFELY_COMPLETE_DAILY_ACTIVITIES: NO
BATHING: NEEDS ASSISTANCE
FEEDING YOURSELF: NEEDS ASSISTANCE
HEARING - RIGHT EAR: FUNCTIONAL
DRESSING YOURSELF: NEEDS ASSISTANCE
GROOMING: NEEDS ASSISTANCE
LACK_OF_TRANSPORTATION: NO
PATIENT'S MEMORY ADEQUATE TO SAFELY COMPLETE DAILY ACTIVITIES?: NO

## 2024-11-12 ASSESSMENT — PATIENT HEALTH QUESTIONNAIRE - PHQ9
2. FEELING DOWN, DEPRESSED OR HOPELESS: NEARLY EVERY DAY
1. LITTLE INTEREST OR PLEASURE IN DOING THINGS: NEARLY EVERY DAY
SUM OF ALL RESPONSES TO PHQ9 QUESTIONS 1 & 2: 6
1. LITTLE INTEREST OR PLEASURE IN DOING THINGS: NEARLY EVERY DAY

## 2024-11-12 ASSESSMENT — PAIN - FUNCTIONAL ASSESSMENT
PAIN_FUNCTIONAL_ASSESSMENT: FLACC (FACE, LEGS, ACTIVITY, CRY, CONSOLABILITY)

## 2024-11-12 ASSESSMENT — LIFESTYLE VARIABLES
AUDIT-C TOTAL SCORE: 1
AUDIT-C TOTAL SCORE: 1
HOW OFTEN DO YOU HAVE A DRINK CONTAINING ALCOHOL: MONTHLY OR LESS
HOW MANY STANDARD DRINKS CONTAINING ALCOHOL DO YOU HAVE ON A TYPICAL DAY: 1 OR 2
HOW OFTEN DO YOU HAVE 6 OR MORE DRINKS ON ONE OCCASION: NEVER
AUDIT-C TOTAL SCORE: 1
SKIP TO QUESTIONS 9-10: 1
HOW OFTEN DO YOU HAVE A DRINK CONTAINING ALCOHOL: MONTHLY OR LESS
AUDIT-C TOTAL SCORE: 1
HOW OFTEN DO YOU HAVE 6 OR MORE DRINKS ON ONE OCCASION: NEVER
SKIP TO QUESTIONS 9-10: 1
HOW MANY STANDARD DRINKS CONTAINING ALCOHOL DO YOU HAVE ON A TYPICAL DAY: 1 OR 2

## 2024-11-12 NOTE — ED PROVIDER NOTES
HPI   Chief Complaint   Patient presents with    Weakness, Gen     Pt home health nurse states that pt is unsafe to be at home. Pt had hip surgery about 4 weeks ago and had been walking fine with a walker. Recently she has developed tremors and is to see neuro for that tomorrow. They were ambulating pt regularly postop and then were told not to so they have not ambulated her this week. Pt is now week and intermittently complains of unspecified pain       Patient has Down syndrome with limited ability to communicate.  Caregivers are present which give very disconjointed history.  What I am able to put together is there concern that the patient is no longer bearing any weight on her recent postop hip as well as her unaffected hip.  Therefore she has not been able to assist with ambulating or transfer.  She does not have assistive devices at home if she is not ambulatory.  She has not been sleeping, the caregivers believe mostly due to pain.  Caregivers had noted that patient has been seated upright and moaning in pain throughout the night.  Patient has been awake for days.  Family doctor ordered a sleeping pill which did not help.  Caregivers also concern with a bedsore.  Patient is currently on an antibiotic for infection, she is taken 1 dose.      History provided by:  Caregiver  History limited by:  Patient nonverbal          Patient History   History reviewed. No pertinent past medical history.  No past surgical history on file.  No family history on file.  Social History     Tobacco Use    Smoking status: Not on file    Smokeless tobacco: Not on file   Substance Use Topics    Alcohol use: Not on file    Drug use: Not on file       Physical Exam   ED Triage Vitals [11/12/24 1321]   Temperature Heart Rate Respirations BP   36.3 °C (97.3 °F) (!) 114 18 111/57      Pulse Ox Temp Source Heart Rate Source Patient Position   96 % Temporal Monitor --      BP Location FiO2 (%)     -- --       Physical Exam  Vitals and  nursing note reviewed.   Constitutional:       General: She is not in acute distress.     Appearance: Normal appearance. She is well-developed. She is obese. She is not ill-appearing or toxic-appearing.      Comments: Patient's cheeks are very pink.  She has a tear on the edge of her right eye.  She appears to have a hard time keeping her eyes open and appears groggy.   HENT:      Head: Normocephalic.      Right Ear: External ear normal.      Left Ear: External ear normal.      Nose: Nose normal.      Mouth/Throat:      Lips: Pink. No lesions.      Mouth: Mucous membranes are moist.      Pharynx: Oropharynx is clear. No oropharyngeal exudate.   Eyes:      General: No scleral icterus.     Conjunctiva/sclera: Conjunctivae normal.   Cardiovascular:      Rate and Rhythm: Normal rate and regular rhythm.      Pulses:           Dorsalis pedis pulses are 2+ on the right side and 2+ on the left side.        Posterior tibial pulses are 2+ on the right side and 2+ on the left side.      Heart sounds: Normal heart sounds.   Pulmonary:      Effort: Pulmonary effort is normal.      Breath sounds: Normal breath sounds and air entry.   Abdominal:      General: Bowel sounds are normal. There is no distension.      Palpations: Abdomen is soft.      Tenderness: There is abdominal tenderness in the suprapubic area. There is no right CVA tenderness, left CVA tenderness or guarding.   Musculoskeletal:      Right lower le+ Pitting Edema present.      Left lower le+ Pitting Edema present.      Comments: Incision of the left hip appears well-healing without erythema or drainage.   Skin:     General: Skin is warm.      Capillary Refill: Capillary refill takes less than 2 seconds.   Neurological:      Mental Status: She is alert. Mental status is at baseline.      Cranial Nerves: Cranial nerves 2-12 are intact.   Psychiatric:         Attention and Perception: Attention normal.         Mood and Affect: Affect is flat.         Behavior:  Behavior is slowed and withdrawn. Behavior is cooperative.           ED Course & MDM   Diagnoses as of 11/16/24 0723   Acute urinary retention   Postoperative seroma of musculoskeletal structure after musculoskeletal procedure   Inability to ambulate due to hip                 No data recorded     [place] Coma Scale Score: 14 (11/15/24 2035 : Katia Pitt RN)                           Medical Decision Making  Patient has Down syndrome with limited ability to communicate.  Caregivers are present which give very disconjointed history.  What I am able to put together is there concern that the patient is no longer bearing any weight on her recent postop hip as well as her unaffected hip.  Therefore she has not been able to assist with ambulating or transfer.  She does not have assistive devices at home if she is not ambulatory.  She has not been sleeping, the caregivers believe mostly due to pain.  Caregivers had noted that patient has been seated upright and moaning in pain throughout the night.  Patient has been awake for days.  Family doctor ordered a sleeping pill which did not help.  Caregivers also concern with a bedsore.  Patient is currently on an antibiotic for infection, she is taken 1 dose.    Ddx: Postop infection, viral illness, UTI, new fracture, other    Will obtain labs and CT of the abdomen pelvis including x-rays of the chest and pelvis  CT is concerning for urinary retention with the bladder well above the umbilicus.  Postop seromas noted on CT as well there is no concern for infection.  Dempsey catheter placed which immediately evacuated well over 1000 ml of urine.  This was then clamped to decrease bladder spasm.  Patient given dose of oxybutynin to also help with bladder spasm.  We will unclamp and fully drain the bladder in a short while to allow the patient to have the most comfort while also relieving her urinary retention.  Caregivers note the patient only urinates twice a day and this is  "\"normal for her.\"  The bladder is fairly distended which indicates that this has been a longstanding issue.  Patient would benefit from Dempsey catheter placement potentially chronically.  Caregivers are okay with Dempsey catheter placement.   Postop seroma can be monitored by orthopedics without any acute concerns.  Caregivers feel that they cannot take the patient home as they have no assistive devices at home and if patient is unable to ambulate on her own they cannot care for her.  Consult to the hospitalist for admission with acceptance for observation status.  Caregivers are requesting nursing home placement.  This can be further evaluated in house.  Patient admitted in improved and stable condition.    The patient was seen by the advanced practice provider.  I have personally performed a substantive portion of the encounter.  I have seen and examined the patient; agree with the workup, evaluation, MDM, management and diagnosis.  The care plan has been discussed.    I personally saw the patient and made/approved the management plan and take responsibility for the patient's management.   History: This 29-year-old white female with history of Down syndrome and limited capability of communication presents to the ED with caregivers due to the patient recently not bearing weight on recent postop hip as well as not bearing weight on the other unaffected to help.  She has not been able to ambulate or transfer, nor does he have any assistant devices to help her at home.  Patient has not been sleeping well and been sitting up moaning of discomfort.  The primary care doctor ordered a sleep medication that has not helped.  And the patient also concern for a decubitus ulcer.  Exam: Gen - alert and awake, non-verbal.  Obese. HEENT -atraumatic normocephalicfacial features oral mucosa pink and moist.  Neck is supple without midline tenderness crepitus or step-off.  Is tachycardic regular without murmur.  Lungs are clear to " auscultation bilaterally abdomen is with suprapubic tenderness there is no CVA tenderness bilaterally.  Bilateral lower extremities have +1 pitting edema.  There is a healing incision over the left hip that appears to be without evidence of secondary infection.  Skin otherwise warm soft dry intact out rash.  No focal neurologic deficits are appreciated and the patient is at baseline mentally per the caregivers.  MDM: Patient has CT scan of the abdomen pelvis and x-rays of the chest and pelvis.  Urinalysis revealed no overt evidence of infection.  Testing for COVID and flu is negative.  White cell count is normal 8.3.  Hemoglobin is 11.4 hematocrit is 35.7 platelets are 520.  CMP is unremarkable except for an albumin of 3.1.  CT scan of the abdomen pelvis revealed postsurgical changes of recent ORIF of the proximal left femur there is an adjacent collection lateral to the left hip measuring 5.4 x 9.7 cm without associated gas locules likely representing a post op seroma.  Urinary bladder is distended up to the level of the umbilicus correlate clinically for bladder outlet obstruction.  Small amount of gas is noted in the anterior aspect of the bladder correlate with urinalysis for possible cystitis.  Hepatic steatosis.  Due to the gross distention of the patient's bladder a Dempsey catheter was placed with more than a liter of urine obtained from the bladder.  Patient was treated with antispasmodic after placement of the Dempsey catheter and subsequently the patient was admitted to the hospital for placement due to inability to ambulate.  The family did not want the patient to go back to [facility] were the hip fracture has been repaired.    Boo Dacosta, DO     Problems Addressed:  Acute urinary retention: undiagnosed new problem with uncertain prognosis     Details: Dempsey catheter placed  Inability to ambulate due to hip: acute illness or injury  Postoperative seroma of musculoskeletal structure after  musculoskeletal procedure: undiagnosed new problem with uncertain prognosis     Details: Recent ORIF of the left hip    Amount and/or Complexity of Data Reviewed  Labs: ordered. Decision-making details documented in ED Course.  Radiology: ordered and independent interpretation performed. Decision-making details documented in ED Course.        Procedure  Procedures     Helen Pollack PA-C  11/12/24 1638       Boo Dacosta DO  11/16/24 0759

## 2024-11-13 ENCOUNTER — APPOINTMENT (OUTPATIENT)
Dept: RADIOLOGY | Facility: HOSPITAL | Age: 29
DRG: 559 | End: 2024-11-13
Payer: MEDICARE

## 2024-11-13 ENCOUNTER — APPOINTMENT (OUTPATIENT)
Dept: NEUROLOGY | Facility: CLINIC | Age: 29
End: 2024-11-13
Payer: MEDICARE

## 2024-11-13 LAB
ALBUMIN SERPL BCP-MCNC: 2.8 G/DL (ref 3.4–5)
ALP SERPL-CCNC: 59 U/L (ref 33–110)
ALT SERPL W P-5'-P-CCNC: 19 U/L (ref 7–45)
ANION GAP SERPL CALC-SCNC: 11 MMOL/L (ref 10–20)
AST SERPL W P-5'-P-CCNC: 17 U/L (ref 9–39)
BILIRUB SERPL-MCNC: 0.3 MG/DL (ref 0–1.2)
BUN SERPL-MCNC: 10 MG/DL (ref 6–23)
CALCIUM SERPL-MCNC: 8.6 MG/DL (ref 8.6–10.3)
CHLORIDE SERPL-SCNC: 105 MMOL/L (ref 98–107)
CO2 SERPL-SCNC: 27 MMOL/L (ref 21–32)
CREAT SERPL-MCNC: 0.74 MG/DL (ref 0.5–1.05)
EGFRCR SERPLBLD CKD-EPI 2021: >90 ML/MIN/1.73M*2
ERYTHROCYTE [DISTWIDTH] IN BLOOD BY AUTOMATED COUNT: 14.4 % (ref 11.5–14.5)
FOLATE SERPL-MCNC: >24 NG/ML
GLUCOSE SERPL-MCNC: 84 MG/DL (ref 74–99)
HCT VFR BLD AUTO: 32.3 % (ref 36–46)
HGB BLD-MCNC: 10.3 G/DL (ref 12–16)
HOLD SPECIMEN: NORMAL
HOLD SPECIMEN: NORMAL
MCH RBC QN AUTO: 32.4 PG (ref 26–34)
MCHC RBC AUTO-ENTMCNC: 31.9 G/DL (ref 32–36)
MCV RBC AUTO: 102 FL (ref 80–100)
NRBC BLD-RTO: 0 /100 WBCS (ref 0–0)
PLATELET # BLD AUTO: 428 X10*3/UL (ref 150–450)
POTASSIUM SERPL-SCNC: 3.8 MMOL/L (ref 3.5–5.3)
PROT SERPL-MCNC: 5.8 G/DL (ref 6.4–8.2)
RBC # BLD AUTO: 3.18 X10*6/UL (ref 4–5.2)
SODIUM SERPL-SCNC: 139 MMOL/L (ref 136–145)
VIT B12 SERPL-MCNC: 797 PG/ML (ref 211–911)
WBC # BLD AUTO: 5.6 X10*3/UL (ref 4.4–11.3)

## 2024-11-13 PROCEDURE — 2500000001 HC RX 250 WO HCPCS SELF ADMINISTERED DRUGS (ALT 637 FOR MEDICARE OP): Performed by: INTERNAL MEDICINE

## 2024-11-13 PROCEDURE — 99233 SBSQ HOSP IP/OBS HIGH 50: CPT | Performed by: HOSPITALIST

## 2024-11-13 PROCEDURE — 80053 COMPREHEN METABOLIC PANEL: CPT | Performed by: HOSPITALIST

## 2024-11-13 PROCEDURE — 72131 CT LUMBAR SPINE W/O DYE: CPT

## 2024-11-13 PROCEDURE — 2500000001 HC RX 250 WO HCPCS SELF ADMINISTERED DRUGS (ALT 637 FOR MEDICARE OP): Performed by: HOSPITALIST

## 2024-11-13 PROCEDURE — 2500000005 HC RX 250 GENERAL PHARMACY W/O HCPCS: Performed by: HOSPITALIST

## 2024-11-13 PROCEDURE — 36415 COLL VENOUS BLD VENIPUNCTURE: CPT | Performed by: HOSPITALIST

## 2024-11-13 PROCEDURE — G0378 HOSPITAL OBSERVATION PER HR: HCPCS

## 2024-11-13 PROCEDURE — 2500000004 HC RX 250 GENERAL PHARMACY W/ HCPCS (ALT 636 FOR OP/ED): Performed by: HOSPITALIST

## 2024-11-13 PROCEDURE — 2500000002 HC RX 250 W HCPCS SELF ADMINISTERED DRUGS (ALT 637 FOR MEDICARE OP, ALT 636 FOR OP/ED): Performed by: HOSPITALIST

## 2024-11-13 PROCEDURE — 72131 CT LUMBAR SPINE W/O DYE: CPT | Performed by: RADIOLOGY

## 2024-11-13 PROCEDURE — 85027 COMPLETE CBC AUTOMATED: CPT | Performed by: HOSPITALIST

## 2024-11-13 RX ORDER — FERROUS SULFATE 325(65) MG
65 TABLET ORAL
Status: DISCONTINUED | OUTPATIENT
Start: 2024-11-13 | End: 2024-11-19 | Stop reason: HOSPADM

## 2024-11-13 RX ORDER — GUAIFENESIN/DEXTROMETHORPHAN 100-10MG/5
10 SYRUP ORAL EVERY 4 HOURS PRN
Status: DISCONTINUED | OUTPATIENT
Start: 2024-11-13 | End: 2024-11-19 | Stop reason: HOSPADM

## 2024-11-13 ASSESSMENT — COGNITIVE AND FUNCTIONAL STATUS - GENERAL
EATING MEALS: A LOT
STANDING UP FROM CHAIR USING ARMS: TOTAL
DRESSING REGULAR LOWER BODY CLOTHING: A LITTLE
DAILY ACTIVITIY SCORE: 15
HELP NEEDED FOR BATHING: A LITTLE
TOILETING: TOTAL
MOBILITY SCORE: 12
MOVING TO AND FROM BED TO CHAIR: TOTAL
DRESSING REGULAR UPPER BODY CLOTHING: A LITTLE
PERSONAL GROOMING: A LITTLE
CLIMB 3 TO 5 STEPS WITH RAILING: TOTAL
WALKING IN HOSPITAL ROOM: TOTAL

## 2024-11-13 ASSESSMENT — PAIN SCALES - WONG BAKER: WONGBAKER_NUMERICALRESPONSE: HURTS LITTLE MORE

## 2024-11-13 NOTE — NURSING NOTE
Discussed plan of care with patient family, Svetlana.  Patient had been crying and complaining of pain.  Family had previously stated concerns for addiction and decreased bowel activity with narcotic pain medicine; patient is currently prescribed Ultram 50 mg.  Family did not initially want narcotic pain medicine given.  Discussed treatment of pain in-hospital, and current ordered medications.  Discussed ongoing assessment of patient in-hospital, including bowel activity.  This RN confirmed with family that we will give the current evening medications and treat pain first with ordered acetaminophen (according to the order) and then administer the tramadol if needed (according to the order) if pain unresolved.  A patient care assistant has been placed in room with patient for comfort and assistance.

## 2024-11-13 NOTE — PROGRESS NOTES
Occupational Therapy                 Therapy Communication Note    Patient Name: Dee Jacob  MRN: [id number]  Department: [facility]  Room: 320/320-A  Today's Date: 11/13/2024     Discipline: Occupational Therapy    Missed Visit Reason: Missed Visit Reason: Patient placed on medical hold. conflicting reports in H&P and from family regarding patient's current mobility and weight bearing status. Message left on voicemail for nurse of physician who performed the ORIF (10/16/24) to find out WB status and clarify if any restrictions. Also secure chat sent- awaiting response.  Will hold OT evaluation until further notice.

## 2024-11-13 NOTE — NURSING NOTE
Called Patients family member Svetlana due to in report I was told family did not want her medicated with the ordered tramadol. I let Svetlana know patient has been consistently crying unconsolably in pain and that I truly think she would benefit from the tramadol to help ease her pain. Svetlana stated absolutely not, she told me patient is being manipulative and that she is addicted to opiates and that she is not to have any pain medication other than tylenol and ibuprofen. I told her I would try tylenol first but I do truly think patient is experiencing some severe pain based on her current state and that patient herself told me she was in pain. Svetlana still persisted she didn't want her to have tramadol. I asked Svetlana if she could come in to help console patient and she stated no. She said she would call someone named Diana and then call back.

## 2024-11-13 NOTE — CONSULTS
"Nutrition Initial Assessment:   Nutrition Assessment    Reason for Assessment: Admission nursing screening    Patient is a 29 y.o. female presenting with generalized weakness. Pt with recent hip surgery 4 weeks ago. Developed tremors and weakness. Denies N/V/D at this time. Consulted for wt loss, poor appetite and wounds. Pt with wound to perineum, R pelvis and L leg. Rec; emery 2x daily for wounds. Pt with no wt loss noted. Note that Pt with down syndrome and does not communicate well. Pt eating well, 75% of meals since admission. Will continue to monitor her intake.       Nutrition History:  Energy Intake: Good > 75 %  Food and Nutrient History: Pt with some decreased appetite due to pain but still eating 75% of all meals. If PO intake goes under 50% for multiple meals in a row consider supplementation.  Food Allergies/Intolerances:  None  GI Symptoms: None  Oral Problems: None       Anthropometrics:  Height: 147.3 cm (4' 10\")   Weight: 83.9 kg (185 lb)   BMI (Calculated): 38.68    Weight Change %:  Weight History / % Weight Change: No weight change noted.    Nutrition Focused Physical Exam Findings:  Subcutaneous Fat Loss:   Orbital Fat Pads: Well nourished (slightly bulging fat pads)  Buccal Fat Pads: Well nourished (full, rounded cheeks)  Muscle Wasting:  Temporalis: Well nourished (well-defined muscle)  Pectoralis (Clavicular Region): Well nourished (clavicle not visible)  Edema:  Edema: none  Physical Findings:  Hair: Negative  Eyes: Negative  Mouth: Negative  Skin: Negative (Skin noting wounds to perineum, L leg, and R pelvis.)    Nutrition Significant Labs:  CBC Trend:   Results from last 7 days   Lab Units 11/13/24  0557 11/12/24  1344   WBC AUTO x10*3/uL 5.6 8.3   RBC AUTO x10*6/uL 3.18* 3.50*   HEMOGLOBIN g/dL 10.3* 11.4*   HEMATOCRIT % 32.3* 35.7*   MCV fL 102* 102*   PLATELETS AUTO x10*3/uL 428 520*    , BMP Trend:   Results from last 7 days   Lab Units 11/13/24  0557 11/12/24  1344   GLUCOSE mg/dL 84 " 81   CALCIUM mg/dL 8.6 9.1   SODIUM mmol/L 139 139   POTASSIUM mmol/L 3.8 3.8   CO2 mmol/L 27 26   CHLORIDE mmol/L 105 104   BUN mg/dL 10 12   CREATININE mg/dL 0.74 0.74    , A1C:  Lab Results   Component Value Date    HGBA1C 4.9 02/28/2024       Nutrition Specific Medications:  ammonium lactate, 1 Application, Topical, BID  ARIPiprazole, 5 mg, oral, q AM  aspirin, 325 mg, oral, BID  DULoxetine, 60 mg, oral, q AM  enoxaparin, 40 mg, subcutaneous, q24h  ferrous sulfate (325 mg ferrous sulfate), 65 mg of iron, oral, Daily with breakfast  fluticasone, 2 spray, Each Nostril, q AM  levothyroxine, 100 mcg, oral, q AM  lidocaine, 1 patch, transdermal, Daily  LORazepam, 1 mg, oral, Nightly  montelukast, 10 mg, oral, q AM  polyethylene glycol, 17 g, oral, Daily  sulfamethoxazole-trimethoprim, 1 tablet, oral, BID  temazepam, 15 mg, oral, Nightly         I/O:    ;      Dietary Orders (From admission, onward)       Start     Ordered    11/12/24 2020  Adult diet Regular  Diet effective now        Question:  Diet type  Answer:  Regular    11/12/24 2019 11/12/24 1811  May Not Participate in Room Service  ( ROOM SERVICE MAY NOT PARTICIPATE)  Once        Question:  .  Answer:  Yes    11/12/24 1810                     Estimated Needs:   Total Energy Estimated Needs (kCal): 1830 kCal  Method for Estimating Needs: 30kcals/kg AdjBW  Total Protein Estimated Needs (g): 74 g  Method for Estimating Needs: 1.2g/kg AdjBW  Total Fluid Estimated Needs (mL): 1830 mL  Method for Estimating Needs: 1ml/kcal        Nutrition Diagnosis   Malnutrition Diagnosis  Patient has Malnutrition Diagnosis: No    Nutrition Diagnosis  Patient has Nutrition Diagnosis: Yes  Diagnosis Status (1): New  Nutrition Diagnosis 1: Increased nutrient needs  Related to (1): wounds  As Evidenced by (1): PI to perineum, wounds to L leg and R pelvis       Nutrition Interventions/Recommendations         Nutrition Prescription:  Individualized Nutrition Prescription  Provided for : Individual nutrition prescription of 1830 kcals and 74g of protein to be provided with diet order.        Nutrition Interventions:   Interventions: Meals and snacks, Medical food supplement  Meals and Snacks:  (Regular diet order.)  Goal: >75% of PO intakes, and high protein diet for wound healing.  Medical Food Supplement: Commercial beverage  Goal: U[place]ano 2x daily for wounds.    Nutrition Monitoring and Evaluation   Food/Nutrient Related History Monitoring  Monitoring and Evaluation Plan: Energy intake  Energy Intake: Estimated energy intake  Criteria: Continue with good po intakes, goal of >75% of all meals.  Additional Plans: U[place]ano 2x daily for wounds.    Body Composition/Growth/Weight History  Monitoring and Evaluation Plan: Weight  Weight: Weight change  Criteria: Continue to monitor wt status and wt changes.    Time Spent (min): 60 minutes

## 2024-11-13 NOTE — CARE PLAN
The patient's goals for the shift include      The clinical goals for the shift include safety      Problem: Nutrition  Goal: Less than 5 days NPO/clear liquids  Outcome: Progressing  Goal: Oral intake greater than 50%  Outcome: Progressing  Goal: Oral intake greater 75%  Outcome: Progressing  Goal: Consume prescribed supplement  Outcome: Progressing  Goal: Adequate PO fluid intake  Outcome: Progressing  Goal: Nutrition support goals are met within 48 hrs  Outcome: Progressing  Goal: Nutrition support is meeting 75% of nutrient needs  Outcome: Progressing  Goal: Tube feed tolerance  Outcome: Progressing  Goal: BG  mg/dL  Outcome: Progressing  Goal: Lab values WNL  Outcome: Progressing  Goal: Electrolytes WNL  Outcome: Progressing  Goal: Promote healing  Outcome: Progressing  Goal: Maintain stable weight  Outcome: Progressing  Goal: Reduce weight from edema/fluid  Outcome: Progressing  Goal: Gradual weight gain  Outcome: Progressing  Goal: Improve ostomy output  Outcome: Progressing     Problem: Skin  Goal: Decreased wound size/increased tissue granulation at next dressing change  Outcome: Progressing  Goal: Participates in plan/prevention/treatment measures  Outcome: Progressing  Goal: Prevent/manage excess moisture  Outcome: Progressing  Goal: Prevent/minimize sheer/friction injuries  Outcome: Progressing  Goal: Promote/optimize nutrition  Outcome: Progressing  Goal: Promote skin healing  Outcome: Progressing     Problem: Pain  Goal: Takes deep breaths with improved pain control throughout the shift  Outcome: Progressing  Goal: Turns in bed with improved pain control throughout the shift  Outcome: Progressing  Goal: Walks with improved pain control throughout the shift  Outcome: Progressing  Goal: Performs ADL's with improved pain control throughout shift  Outcome: Progressing  Goal: Participates in PT with improved pain control throughout the shift  Outcome: Progressing  Goal: Free from opioid side effects  throughout the shift  Outcome: Progressing  Goal: Free from acute confusion related to pain meds throughout the shift  Outcome: Progressing

## 2024-11-13 NOTE — H&P
"History Of Present Illness  Dee Jacob is a 29 y.o. female presenting with generalized weakness.  Per home health nurse not safe to be at home.  She had recent left hip surgery 4 weeks ago walking fine with a walker but recently developed tremors and supposed to see neurology for that reason.  She has developed deconditioning weakness and needs higher level of care likely placement in skilled nursing rehab no nausea vomiting diarrhea or fever chills  No bleeding.  Patient unable to give a history due to Down syndrome.  Unable to communicate.  She is unable to bear weight on her postop hip as well and affected hip.  She does not have assistive devices at home to help her with ambulation.  She has been in distress for pain.  Caregiver concerned about bedsore and she has been on antibiotics for possible infection in skin.  Further history limited at this time     Past Medical History  Down syndrome    Surgical History  Hip surgery     Social History  She has no history on file for tobacco use, alcohol use, and drug use.    Family History  Unable to obtain at this time     Allergies  Patient has no known allergies.    Review of Systems  All other 12 point review of systems is negative except HPI  Physical Exam   General Appearance: AAO x0  Skin: skin color pink, warm, and dry; no suspicious rashes or lesions  Eyes : PERRL, EOM's intact  ENT: mucous membranes pink and moist  Neck: normocephalic  Respiratory: lungs clear to auscultation anteriorly; no wheezing, rhonchi, or crackles.   Heart: regular rate and rhythm.   Abdomen: Nondistended, positive bowel sounds x4, soft,  nontender  Extremities: no edema, not cooperative to test range of motion extremities  Peripheral pulses: normal x4 extremities  Neuro: Awake, cannot follow commands  Last Recorded Vitals  Blood pressure 113/70, pulse (!) 120, temperature 36.3 °C (97.3 °F), temperature source Temporal, resp. rate 18, height 1.473 m (4' 10\"), weight 83.9 kg (185 " lb), SpO2 95%.    Relevant Results  Scheduled medications  ammonium lactate, 1 Application, Topical, BID  [START ON 11/13/2024] ARIPiprazole, 5 mg, oral, q AM  aspirin, 325 mg, oral, BID  [START ON 11/13/2024] DULoxetine, 60 mg, oral, q AM  enoxaparin, 40 mg, subcutaneous, q24h  [START ON 11/13/2024] fluticasone, 2 spray, Each Nostril, q AM  iron polysaccharides, 150 mg, oral, Daily  [START ON 11/13/2024] levothyroxine, 100 mcg, oral, q AM  lidocaine, 1 patch, transdermal, Daily  LORazepam, 1 mg, oral, Nightly  [START ON 11/13/2024] montelukast, 10 mg, oral, q AM  polyethylene glycol, 17 g, oral, Daily  sulfamethoxazole-trimethoprim, 1 tablet, oral, BID  temazepam, 15 mg, oral, Nightly      Continuous medications     PRN medications  PRN medications: acetaminophen **OR** acetaminophen **OR** acetaminophen, acetaminophen **OR** acetaminophen **OR** acetaminophen, ondansetron **OR** ondansetron, traMADol    Results for orders placed or performed during the hospital encounter of 11/12/24 (from the past 24 hours)   Influenza A, and B PCR   Result Value Ref Range    Flu A Result Not Detected Not Detected    Flu B Result Not Detected Not Detected   Sars-CoV-2 PCR   Result Value Ref Range    Coronavirus 2019, PCR Not Detected Not Detected   Comprehensive metabolic panel   Result Value Ref Range    Glucose 81 74 - 99 mg/dL    Sodium 139 136 - 145 mmol/L    Potassium 3.8 3.5 - 5.3 mmol/L    Chloride 104 98 - 107 mmol/L    Bicarbonate 26 21 - 32 mmol/L    Anion Gap 13 10 - 20 mmol/L    Urea Nitrogen 12 6 - 23 mg/dL    Creatinine 0.74 0.50 - 1.05 mg/dL    eGFR >90 >60 mL/min/1.73m*2    Calcium 9.1 8.6 - 10.3 mg/dL    Albumin 3.1 (L) 3.4 - 5.0 g/dL    Alkaline Phosphatase 65 33 - 110 U/L    Total Protein 6.8 6.4 - 8.2 g/dL    AST 20 9 - 39 U/L    Bilirubin, Total 0.5 0.0 - 1.2 mg/dL    ALT 22 7 - 45 U/L   CBC   Result Value Ref Range    WBC 8.3 4.4 - 11.3 x10*3/uL    nRBC 0.0 0.0 - 0.0 /100 WBCs    RBC 3.50 (L) 4.00 - 5.20  x10*6/uL    Hemoglobin 11.4 (L) 12.0 - 16.0 g/dL    Hematocrit 35.7 (L) 36.0 - 46.0 %     (H) 80 - 100 fL    MCH 32.6 26.0 - 34.0 pg    MCHC 31.9 (L) 32.0 - 36.0 g/dL    RDW 14.3 11.5 - 14.5 %    Platelets 520 (H) 150 - 450 x10*3/uL   Lactate   Result Value Ref Range    Lactate 1.6 0.4 - 2.0 mmol/L   Blood Culture    Specimen: Peripheral Venipuncture; Blood culture   Result Value Ref Range    Blood Culture Loaded on Instrument - Culture in progress    Blood Culture    Specimen: Peripheral Venipuncture; Blood culture   Result Value Ref Range    Blood Culture Loaded on Instrument - Culture in progress    Light Blue Top   Result Value Ref Range    Extra Tube Hold for add-ons.    SST TOP   Result Value Ref Range    Extra Tube Hold for add-ons.    Urinalysis with Reflex Culture and Microscopic   Result Value Ref Range    Color, Urine Yellow Light-Yellow, Yellow, Dark-Yellow    Appearance, Urine Clear Clear    Specific Gravity, Urine 1.027 1.005 - 1.035    pH, Urine 6.5 5.0, 5.5, 6.0, 6.5, 7.0, 7.5, 8.0    Protein, Urine 20 (TRACE) NEGATIVE, 10 (TRACE), 20 (TRACE) mg/dL    Glucose, Urine Normal Normal mg/dL    Blood, Urine NEGATIVE NEGATIVE    Ketones, Urine TRACE (A) NEGATIVE mg/dL    Bilirubin, Urine NEGATIVE NEGATIVE    Urobilinogen, Urine Normal Normal mg/dL    Nitrite, Urine NEGATIVE NEGATIVE    Leukocyte Esterase, Urine NEGATIVE NEGATIVE   Urinalysis Microscopic   Result Value Ref Range    WBC, Urine 1-5 1-5, NONE /HPF    RBC, Urine 1-2 NONE, 1-2, 3-5 /HPF    Mucus, Urine 2+ Reference range not established. /LPF    Calcium Oxalate Crystals, Urine 1+ NONE, 1+ /HPF       CT abdomen pelvis w IV contrast    Result Date: 11/12/2024  STUDY: CT Abdomen and Pelvis with IV Contrast; 11/12/2024 at 3:45 PM INDICATION: Pelvic pain. Recent ORIF left hip. COMPARISON: XR pelvis, XR chest 11/12/24. ACCESSION NUMBER(S): [id number] ORDERING CLINICIAN: SCOTT ANDERSEN TECHNIQUE: CT of the abdomen and pelvis was performed.   Contiguous axial images were obtained at 3 mm slice thickness through the abdomen and pelvis. Coronal and sagittal reconstructions at 3 mm slice thickness were performed.  Omnipaque-350 70 mL was administered intravenously.  FINDINGS: LOWER CHEST: No cardiomegaly.  No pericardial effusion.  Lung bases are clear.  ABDOMEN:  LIVER: No hepatomegaly.  Smooth surface contour.  There is fatty infiltration of the liver.  BILE DUCTS: No intrahepatic or extrahepatic biliary ductal dilatation.  GALLBLADDER: The gallbladder is present without gallstones. STOMACH: No abnormalities identified.  PANCREAS: No masses or ductal dilatation.  SPLEEN: No splenomegaly or focal splenic lesion.  ADRENAL GLANDS: No thickening or nodules.  KIDNEYS AND URETERS: Kidneys are normal in size and location.  No renal or ureteral calculi.  PELVIS:  BLADDER: Urinary bladder is distended up to the level of the umbilicus.  Small amount of gas noted in the anterior aspect of the bladder.  REPRODUCTIVE ORGANS: No abnormalities identified.  BOWEL: No abnormalities identified.  VESSELS: No abnormalities identified.  Abdominal aorta is normal in caliber.  PERITONEUM/RETROPERITONEUM/LYMPH NODES: No free fluid.  No pneumoperitoneum. No lymphadenopathy.  ABDOMINAL WALL: No abnormalities identified. SOFT TISSUES: No abnormalities identified.  BONES: No acute fracture or aggressive osseous lesion.  Post surgical changes of a recent ORIF of the proximal left femur.  There is an adjacent collection lateral to the left hip measuring 5.4 cm x 9.7 cm without associated gas locules likely representing a postop seroma.    1.  Postsurgical changes of a recent ORIF of the proximal left femur. There is an adjacent collection lateral to the left hip measuring 5.4 cm x 9.7 cm without associated gas locules likely representing a postop seroma. 2. Urinary bladder is distended up to the level of the umbilicus. Correlate clinically for bladder outlet obstruction.  Small amount  of gas noted in the anterior aspect of the bladder. Correlate with urinalysis for possible cystitis. 3. Hepatic steatosis. Signed by Francesco Chen MD    XR pelvis 1-2 views    Result Date: 11/12/2024  STUDY: Pelvis Radiographs; 11/12/24 at 2:06 PM INDICATION: Pain.  No trauma information provided, if any. The specific site of pain, if any, is not provided. COMPARISON: None available. ACCESSION NUMBER(S): [id number] ORDERING CLINICIAN: SCOTT ANDERSEN TECHNIQUE:  One view(s) of the pelvis. The technologist provides no information regarding injury, if any. Opaque markers placed to indicate an area of specific clinical concern: none. FINDINGS:  The patient is status post surgical repair of left femoral neck fracture.  There is a varus deformity.  No prior examination available for comparison.  Several of the fragments appear ununited.  Are the patient's symptoms referable to the left hip??  Conceivably, there could be a superimposed new fracture on old.  SI joints are intact. No pelvic bone fracture.    No pelvic bone fracture.  Status post surgical repair of the left femoral neck fracture. Signed by Jose Villarreal MD    XR chest 1 view    Result Date: 11/12/2024  STUDY: Chest Radiograph;  11/12/24 at 2:06 PM INDICATION: Weakness. COMPARISON: None available. ACCESSION NUMBER(S): [id number] ORDERING CLINICIAN: SCOTT ANDERSEN TECHNIQUE:  Frontal chest was obtained at 1405 hours. FINDINGS: CARDIOMEDIASTINAL SILHOUETTE: Cardiomediastinal silhouette is normal in size and configuration.  LUNGS: Lungs are clear.  ABDOMEN: No remarkable upper abdominal findings.  BONES: No acute osseous changes.    No acute process. Signed by Francesco Chen MD            Assessment/Plan   Assessment & Plan  Weakness      29-year-old female with  Left hip surgery open reduction internal fixation of proximal left femur recently by orthopedics and postop seroma  Down syndrome  Possible cellulitis, bedsore  Anemia  Generalized weakness,  deconditioning  Ambulatory dysfunction  Urinary retention status post Dempsey    Plan  Flu and COVID-19 test negative and urinalysis clear, chest x-ray no acute process  Follow case management for placement  Continue home medicines  Tylenol for pain  Tramadol for moderate to severe pain  Lidocaine patch  Patient on Bactrim to continue to complete the course  Montelukast 10 Mg daily  Supportive care symptomatic management  On Lovenox for DVT prophylaxis  Antihistamine and Flonase spray for allergies  Antiemetics if required  Bowel regimen for constipation prophylaxis  On Abilify, Cymbalta, Ativan for psych issues  Continue Synthroid for hypothyroidism  On temazepam also for anxiety irritation and spasm  Daily CBC BMP and follow vitals  DC when arrangements are made  Follow orthopedics outpatient  To follow urology outpatient as deemed appropriate  X-ray pelvis did not show acute process.  CT abdomen pelvis without acute process but possible cystitis  Already on antibiotics for possible skin infection  Hydrate well  Nutrition  PT OT  Iron panel serum ferritin and stool FOBT  Further management as clinical course evolves        Valerie Severino MD

## 2024-11-13 NOTE — PROGRESS NOTES
Physical Therapy                 Therapy Communication Note    Patient Name: Dee Jacob  MRN: [id number]  Department: [facility]  Room: 320/[facility]  Today's Date: 11/13/2024     Discipline: Physical Therapy    Missed Visit Reason: Missed Visit Reason: Patient placed on medical hold due to conflicting reports in H&P and from family regarding patient's current mobiity and weight bearing status.  Message left on voicemail for nurse of physician who performed the ORIF to find out WB  status and clarify if any restrictions  hold until more information is known about patient.    Missed Time: Attempt    Comment:

## 2024-11-13 NOTE — PROGRESS NOTES
11/13/24 1500   Discharge Planning   Living Arrangements Family members   Support Systems Family members;Home care staff  ([facility], [facility] for aides)   Type of Residence Private residence   Number of Stairs to Enter Residence 0   Number of Stairs Within Residence 0   Do you have animals or pets at home? Yes   Type of Animals or Pets 3 dogs   Who is requesting discharge planning? Provider   Home or Post Acute Services Post acute facilities (Rehab/SNF/etc)   Type of Post Acute Facility Services Rehab   Expected Discharge Disposition SNF      has spent significant time on this case over the course of the day today. Above information provided by patient's sister/POA/Svetlana. Patient has SSA through the [employer]/Denise Kang. Prior to coming into the hospital, patient was receiving services through [facility], and [employer] arranged aides 12 hrs/day through [facility]. Nursing expressed concern about patient's condition when patient admitted through ED. Case is open with  for [employer] as care providers and patient's sister have expressed concern about patient's treatment at different times. SSA/Denise gave updates re: same, and assured SW that investigation is ongoing. Patient's sister requesting placement at [facility]. SW to send referral to same. [facility] will likely trip HENS screen and initiate a Level 2 investigation; Patient's SSA/Denise will also try to find emergency placement as patient's sister does not feel she can safely manage patient's needs in her home any longer. [facility], Medicaid Level of Care. Care Transitions to follow and assist. MAGGIE Almendarez

## 2024-11-13 NOTE — PROGRESS NOTES
"Dee Jacob is a 29 y.o. female on day 0 of admission presenting with Weakness.    Subjective   Patient nonverbal noncommunicative       Objective     Physical Exam  General Appearance: AAO x0  Skin: skin color pink, warm, and dry; no suspicious rashes or lesions  Eyes : PERRL, EOM's intact  ENT: mucous membranes pink and moist  Neck: normocephalic  Respiratory: lungs clear to auscultation anteriorly; no wheezing, rhonchi, or crackles.   Heart: regular rate and rhythm.   Abdomen: Nondistended, positive bowel sounds x4, soft,  nontender  Extremities: no edema, not cooperative to test range of motion extremities  Peripheral pulses: normal x4 extremities  Neuro: Awake, cannot follow commands  Last Recorded Vitals  Blood pressure 104/67, pulse 107, temperature 36.7 °C (98.1 °F), resp. rate 18, height 1.473 m (4' 10\"), weight 83.9 kg (185 lb), SpO2 95%.  Intake/Output last 3 Shifts:  I/O last 3 completed shifts:  In: - (0 mL/kg)   Out: 1500 (17.9 mL/kg) [Urine:1500 (0.5 mL/kg/hr)]  Weight: 83.9 kg     Relevant Results    Scheduled medications  ammonium lactate, 1 Application, Topical, BID  ARIPiprazole, 5 mg, oral, q AM  aspirin, 325 mg, oral, BID  DULoxetine, 60 mg, oral, q AM  enoxaparin, 40 mg, subcutaneous, q24h  ferrous sulfate (325 mg ferrous sulfate), 65 mg of iron, oral, Daily with breakfast  fluticasone, 2 spray, Each Nostril, q AM  levothyroxine, 100 mcg, oral, q AM  lidocaine, 1 patch, transdermal, Daily  LORazepam, 1 mg, oral, Nightly  montelukast, 10 mg, oral, q AM  polyethylene glycol, 17 g, oral, Daily  sulfamethoxazole-trimethoprim, 1 tablet, oral, BID  temazepam, 15 mg, oral, Nightly      Continuous medications     PRN medications  PRN medications: acetaminophen **OR** acetaminophen **OR** acetaminophen, acetaminophen **OR** acetaminophen **OR** acetaminophen, [DISCONTINUED] ondansetron **OR** ondansetron, ondansetron ODT, traMADol    Results for orders placed or performed during the hospital " encounter of 11/12/24 (from the past 24 hours)   CBC   Result Value Ref Range    WBC 5.6 4.4 - 11.3 x10*3/uL    nRBC 0.0 0.0 - 0.0 /100 WBCs    RBC 3.18 (L) 4.00 - 5.20 x10*6/uL    Hemoglobin 10.3 (L) 12.0 - 16.0 g/dL    Hematocrit 32.3 (L) 36.0 - 46.0 %     (H) 80 - 100 fL    MCH 32.4 26.0 - 34.0 pg    MCHC 31.9 (L) 32.0 - 36.0 g/dL    RDW 14.4 11.5 - 14.5 %    Platelets 428 150 - 450 x10*3/uL   Comprehensive metabolic panel   Result Value Ref Range    Glucose 84 74 - 99 mg/dL    Sodium 139 136 - 145 mmol/L    Potassium 3.8 3.5 - 5.3 mmol/L    Chloride 105 98 - 107 mmol/L    Bicarbonate 27 21 - 32 mmol/L    Anion Gap 11 10 - 20 mmol/L    Urea Nitrogen 10 6 - 23 mg/dL    Creatinine 0.74 0.50 - 1.05 mg/dL    eGFR >90 >60 mL/min/1.73m*2    Calcium 8.6 8.6 - 10.3 mg/dL    Albumin 2.8 (L) 3.4 - 5.0 g/dL    Alkaline Phosphatase 59 33 - 110 U/L    Total Protein 5.8 (L) 6.4 - 8.2 g/dL    AST 17 9 - 39 U/L    Bilirubin, Total 0.3 0.0 - 1.2 mg/dL    ALT 19 7 - 45 U/L   SST TOP   Result Value Ref Range    Extra Tube Hold for add-ons.      CT abdomen pelvis w IV contrast    Addendum Date: 11/13/2024    Findings discussed with and acknowledged byDr nAgela Fitzgerald at 3:13 PM Signed by Francesco Chen MD    Addendum Date: 11/13/2024    No presurgical radiographs or CT is available for comparison.  There are significant streak artifact surrounding the left hip. Age-indeterminate comminuted fracture through the intertrochanteric region with the proximal portion of the femoral neck being slightly posteriorly displaced relative to the orthopedic hardware.  Findings presumably secondary to interval trauma or hardware failure. Recommend surgical consultation. Signed by Francesco Chen MD    Result Date: 11/13/2024  STUDY: CT Abdomen and Pelvis with IV Contrast; 11/12/2024 at 3:45 PM INDICATION: Pelvic pain. Recent ORIF left hip. COMPARISON: XR pelvis, XR chest 11/12/24. ACCESSION NUMBER(S): [id number] ORDERING CLINICIAN: SOCTT RENTERIA  ANDERSEN TECHNIQUE: CT of the abdomen and pelvis was performed.  Contiguous axial images were obtained at 3 mm slice thickness through the abdomen and pelvis. Coronal and sagittal reconstructions at 3 mm slice thickness were performed.  Omnipaque-350 70 mL was administered intravenously.  FINDINGS: LOWER CHEST: No cardiomegaly.  No pericardial effusion.  Lung bases are clear.  ABDOMEN:  LIVER: No hepatomegaly.  Smooth surface contour.  There is fatty infiltration of the liver.  BILE DUCTS: No intrahepatic or extrahepatic biliary ductal dilatation.  GALLBLADDER: The gallbladder is present without gallstones. STOMACH: No abnormalities identified.  PANCREAS: No masses or ductal dilatation.  SPLEEN: No splenomegaly or focal splenic lesion.  ADRENAL GLANDS: No thickening or nodules.  KIDNEYS AND URETERS: Kidneys are normal in size and location.  No renal or ureteral calculi.  PELVIS:  BLADDER: Urinary bladder is distended up to the level of the umbilicus.  Small amount of gas noted in the anterior aspect of the bladder.  REPRODUCTIVE ORGANS: No abnormalities identified.  BOWEL: No abnormalities identified.  VESSELS: No abnormalities identified.  Abdominal aorta is normal in caliber.  PERITONEUM/RETROPERITONEUM/LYMPH NODES: No free fluid.  No pneumoperitoneum. No lymphadenopathy.  ABDOMINAL WALL: No abnormalities identified. SOFT TISSUES: No abnormalities identified.  BONES: No acute fracture or aggressive osseous lesion.  Post surgical changes of a recent ORIF of the proximal left femur.  There is an adjacent collection lateral to the left hip measuring 5.4 cm x 9.7 cm without associated gas locules likely representing a postop seroma.    1.  Postsurgical changes of a recent ORIF of the proximal left femur. There is an adjacent collection lateral to the left hip measuring 5.4 cm x 9.7 cm without associated gas locules likely representing a postop seroma. 2. Urinary bladder is distended up to the level of the umbilicus.  Correlate clinically for bladder outlet obstruction.  Small amount of gas noted in the anterior aspect of the bladder. Correlate with urinalysis for possible cystitis. 3. Hepatic steatosis. Signed by Francesco Chen MD    XR pelvis 1-2 views    Result Date: 11/12/2024  STUDY: Pelvis Radiographs; 11/12/24 at 2:06 PM INDICATION: Pain.  No trauma information provided, if any. The specific site of pain, if any, is not provided. COMPARISON: None available. ACCESSION NUMBER(S): [id number] ORDERING CLINICIAN: SCOTT ANDERSEN TECHNIQUE:  One view(s) of the pelvis. The technologist provides no information regarding injury, if any. Opaque markers placed to indicate an area of specific clinical concern: none. FINDINGS:  The patient is status post surgical repair of left femoral neck fracture.  There is a varus deformity.  No prior examination available for comparison.  Several of the fragments appear ununited.  Are the patient's symptoms referable to the left hip??  Conceivably, there could be a superimposed new fracture on old.  SI joints are intact. No pelvic bone fracture.    No pelvic bone fracture.  Status post surgical repair of the left femoral neck fracture. Signed by Jose Villarreal MD    XR chest 1 view    Result Date: 11/12/2024  STUDY: Chest Radiograph;  11/12/24 at 2:06 PM INDICATION: Weakness. COMPARISON: None available. ACCESSION NUMBER(S): [id number] ORDERING CLINICIAN: SCOTT ANDERSEN TECHNIQUE:  Frontal chest was obtained at 1405 hours. FINDINGS: CARDIOMEDIASTINAL SILHOUETTE: Cardiomediastinal silhouette is normal in size and configuration.  LUNGS: Lungs are clear.  ABDOMEN: No remarkable upper abdominal findings.  BONES: No acute osseous changes.    No acute process. Signed by Francesco Chen MD              This patient has a urinary catheter   Reason for the urinary catheter remaining today? urinary retention/bladder outlet obstruction, acute or chronic               Assessment/Plan   Assessment &  Plan  Weakness      29-year-old female with  Recent history of left hip surgery with open reduction internal fixation of proximal left femur  Postop seroma  Down syndrome  Recent cellulitis, bedsore  Anemia  Generalized weakness, deconditioning  Ambulatory dysfunction  Urine retention status post Dempsey  Possible cystitis    Plan  Follow orthopedic surgery recommendations,  X-ray left hip tomorrow to reevaluate any postop changes and imaging results to be faxed to patient's primary orthopedic surgeon Dr. Odonnell  CT lumbar spine to evaluate any discomfort in low back  Discussed with staff  After further imaging, to discuss with power of /sister  Discussed with patient's primary care physician  Patient was scheduled for neurology appointment but unable due to her admission here  Discussed with PCP, can follow nerve conduction study outpatient with neurology in office  Continue supportive care symptomatic management  I was informed by staff, family wants to avoid narcotics as best possible  Will try to control any discomfort distress or pain through Tylenol, ibuprofen, lidocaine patch as required  Hydrate and nutrition  On aspirin 325 mg  p.o. twice daily for DVT prophylaxis  On Bactrim for previous infection as outpatient  Daily CBC BMP on follow vitals  Iron supplement  Flonase.  Continue psych medicines and benzodiazepines for anxiety agitation restlessness  Synthroid for hypothyroidism  Continue current medicines  To follow urology outpatient as well neurology                   Vlaerie Severino MD

## 2024-11-14 ENCOUNTER — APPOINTMENT (OUTPATIENT)
Dept: RADIOLOGY | Facility: HOSPITAL | Age: 29
DRG: 559 | End: 2024-11-14
Payer: MEDICARE

## 2024-11-14 PROBLEM — R33.8 ACUTE URINARY RETENTION: Status: ACTIVE | Noted: 2024-11-14

## 2024-11-14 LAB
ANION GAP SERPL CALC-SCNC: 9 MMOL/L (ref 10–20)
BUN SERPL-MCNC: 13 MG/DL (ref 6–23)
CALCIUM SERPL-MCNC: 8.6 MG/DL (ref 8.6–10.3)
CHLORIDE SERPL-SCNC: 105 MMOL/L (ref 98–107)
CO2 SERPL-SCNC: 28 MMOL/L (ref 21–32)
CREAT SERPL-MCNC: 0.69 MG/DL (ref 0.5–1.05)
EGFRCR SERPLBLD CKD-EPI 2021: >90 ML/MIN/1.73M*2
ERYTHROCYTE [DISTWIDTH] IN BLOOD BY AUTOMATED COUNT: 14.6 % (ref 11.5–14.5)
GLUCOSE SERPL-MCNC: 88 MG/DL (ref 74–99)
HCT VFR BLD AUTO: 34.1 % (ref 36–46)
HGB BLD-MCNC: 10.8 G/DL (ref 12–16)
MCH RBC QN AUTO: 32.6 PG (ref 26–34)
MCHC RBC AUTO-ENTMCNC: 31.7 G/DL (ref 32–36)
MCV RBC AUTO: 103 FL (ref 80–100)
NRBC BLD-RTO: 0 /100 WBCS (ref 0–0)
PLATELET # BLD AUTO: 413 X10*3/UL (ref 150–450)
POTASSIUM SERPL-SCNC: 4.1 MMOL/L (ref 3.5–5.3)
RBC # BLD AUTO: 3.31 X10*6/UL (ref 4–5.2)
SODIUM SERPL-SCNC: 138 MMOL/L (ref 136–145)
WBC # BLD AUTO: 5.7 X10*3/UL (ref 4.4–11.3)

## 2024-11-14 PROCEDURE — 2500000001 HC RX 250 WO HCPCS SELF ADMINISTERED DRUGS (ALT 637 FOR MEDICARE OP): Performed by: INTERNAL MEDICINE

## 2024-11-14 PROCEDURE — 82270 OCCULT BLOOD FECES: CPT | Mod: SAMLAB | Performed by: HOSPITALIST

## 2024-11-14 PROCEDURE — 2500000001 HC RX 250 WO HCPCS SELF ADMINISTERED DRUGS (ALT 637 FOR MEDICARE OP): Performed by: HOSPITALIST

## 2024-11-14 PROCEDURE — 36415 COLL VENOUS BLD VENIPUNCTURE: CPT | Performed by: HOSPITALIST

## 2024-11-14 PROCEDURE — 99232 SBSQ HOSP IP/OBS MODERATE 35: CPT | Performed by: HOSPITALIST

## 2024-11-14 PROCEDURE — 85027 COMPLETE CBC AUTOMATED: CPT | Performed by: HOSPITALIST

## 2024-11-14 PROCEDURE — 2500000002 HC RX 250 W HCPCS SELF ADMINISTERED DRUGS (ALT 637 FOR MEDICARE OP, ALT 636 FOR OP/ED): Performed by: HOSPITALIST

## 2024-11-14 PROCEDURE — 73502 X-RAY EXAM HIP UNI 2-3 VIEWS: CPT | Mod: LT

## 2024-11-14 PROCEDURE — 73502 X-RAY EXAM HIP UNI 2-3 VIEWS: CPT | Mod: LEFT SIDE | Performed by: RADIOLOGY

## 2024-11-14 PROCEDURE — 99223 1ST HOSP IP/OBS HIGH 75: CPT | Performed by: SPECIALIST

## 2024-11-14 PROCEDURE — 80048 BASIC METABOLIC PNL TOTAL CA: CPT | Performed by: HOSPITALIST

## 2024-11-14 PROCEDURE — 1100000001 HC PRIVATE ROOM DAILY

## 2024-11-14 PROCEDURE — 2500000005 HC RX 250 GENERAL PHARMACY W/O HCPCS: Performed by: HOSPITALIST

## 2024-11-14 PROCEDURE — 2500000004 HC RX 250 GENERAL PHARMACY W/ HCPCS (ALT 636 FOR OP/ED): Performed by: HOSPITALIST

## 2024-11-14 RX ORDER — MORPHINE SULFATE 10 MG/.5ML
5 SOLUTION ORAL EVERY 4 HOURS PRN
Status: DISCONTINUED | OUTPATIENT
Start: 2024-11-14 | End: 2024-11-19 | Stop reason: HOSPADM

## 2024-11-14 ASSESSMENT — PAIN SCALES - WONG BAKER
WONGBAKER_NUMERICALRESPONSE: HURTS EVEN MORE
WONGBAKER_NUMERICALRESPONSE: HURTS LITTLE MORE

## 2024-11-14 ASSESSMENT — PAIN SCALES - GENERAL
PAINLEVEL_OUTOF10: 3
PAINLEVEL_OUTOF10: 0 - NO PAIN

## 2024-11-14 ASSESSMENT — COGNITIVE AND FUNCTIONAL STATUS - GENERAL
DRESSING REGULAR LOWER BODY CLOTHING: A LOT
EATING MEALS: A LOT
WALKING IN HOSPITAL ROOM: A LOT
STANDING UP FROM CHAIR USING ARMS: A LOT
TOILETING: A LOT
DAILY ACTIVITIY SCORE: 12
DRESSING REGULAR UPPER BODY CLOTHING: A LOT
HELP NEEDED FOR BATHING: A LOT
MOBILITY SCORE: 12
TURNING FROM BACK TO SIDE WHILE IN FLAT BAD: A LOT
MOVING TO AND FROM BED TO CHAIR: A LOT
MOVING FROM LYING ON BACK TO SITTING ON SIDE OF FLAT BED WITH BEDRAILS: A LITTLE
CLIMB 3 TO 5 STEPS WITH RAILING: TOTAL
PERSONAL GROOMING: A LOT

## 2024-11-14 ASSESSMENT — PAIN DESCRIPTION - LOCATION
LOCATION: HIP
LOCATION: HIP

## 2024-11-14 ASSESSMENT — PAIN - FUNCTIONAL ASSESSMENT
PAIN_FUNCTIONAL_ASSESSMENT: 0-10

## 2024-11-14 ASSESSMENT — PAIN DESCRIPTION - ORIENTATION
ORIENTATION: LEFT
ORIENTATION: LEFT

## 2024-11-14 NOTE — NURSING NOTE
Margo Asher, RN contacted Dr. Severino, pt IV came out, contacted by secure chat; per Dr. Tellez to leave iv out for now.

## 2024-11-14 NOTE — PROGRESS NOTES
Physical Therapy                 Therapy Communication Note    Patient Name: Dee Jacob  MRN: [id number]  Department: [facility]  Room: [address] [place], [place] [address]  Today's Date: 11/14/2024     Discipline: Physical Therapy    Missed Visit Reason: Missed Visit Reason: Patient placed on medical hold (PT eval on hold until clarification of plan is received from orthopedic surgeon DR Odonnell per Dr castro note)    Missed Time: Attempt    Comment:

## 2024-11-14 NOTE — PROGRESS NOTES
"Occupational Therapy                 Therapy Communication Note    Patient Name: Dee Jacob  MRN: [id number]  Department: [facility]  Room: 320/320-A  Today's Date: 11/14/2024     Discipline: Occupational Therapy    Missed Visit Reason: Missed Visit Reason: Other (Comment) (per chart review via Dr Orlando documentation: \"AP pelvis is suspicious for loss of fixation. Further studies are ordered. Dr. Bautista requested further plan xrays and these are scheduled for this am.  Will defer to his management as he was the index surgeon\"    Will hold OT evaluation until further clarification from Dr. Bautista's office    "

## 2024-11-14 NOTE — PROGRESS NOTES
Per medical team, patient is now appropriate for inpatient status. Patient may be appropriate for transfer, pending orthopedic consult. Per [facility]CU cannot accept patient.  to meet with patient/family at bedside to review discharge options, pending recommendations from patient's surgeon/Dr. Odonnell. SW/DSC to additional referrals via [facility] pending patient/family choices.  - 1600: Per hospitalist, surgeon Dr. Odonnell states that patient may bear weight as tolerated. SW relayed same to PT/OT so that patient may receive therapy evals for possible SNF placement. Plan for patient TBD: SNF pending PT/OT evals, referrals, acceptance, qualifying 3-midnight inpatient stay. SW to update patient's SSA through the Board of [employer]/Denise Kang as discharge plan becomes clearer. Care Transitions to follow and assist. MAGGIE Gonsales

## 2024-11-14 NOTE — CARE PLAN
The patient's goals for the shift -pt unable to stated    The clinical goals for the shift include Monitor v/s and labs, comfort and rest    Over the shift, the patient did not make progress toward the following goals. Barriers to progression include cognitive delay. Recommendations to address these barriers include hourly rounding.    Problem: Skin  Goal: Promote skin healing  Outcome: Not Progressing  Note: Patient guarded with assessment, pushes away RN       Problem: Nutrition  Goal: Oral intake greater 75%  Outcome: Progressing     Problem: Pain  Goal: Performs ADL's with improved pain control throughout shift  Outcome: Progressing     Problem: Pain - Adult  Goal: Verbalizes/displays adequate comfort level or baseline comfort level  Flowsheets (Taken 11/14/2024 0109)  Verbalizes/displays adequate comfort level or baseline comfort level: Assess pain using appropriate pain scale  Note: Lea - Martinez scale

## 2024-11-14 NOTE — CONSULTS
"Reason For Consult  Left hip fracture approximately 4 weeks s/p fixation with Dr. Bautista    History Of Present Illness  Dee Jacob is a 29 y.o. female presenting with left hip pain. She has downs and is Non-verbal.  AP pelvis is suspicious for loss of fixation. Further studies are ordered     Past Medical History  She has no past medical history on file.    Surgical History  She has no past surgical history on file.     Social History  She has no history on file for tobacco use, alcohol use, and drug use.    Family History  No family history on file.     Allergies  Patient has no known allergies.    Review of Systems  See medical admission note     Physical Exam  Left hip  Incision is intact with min erythema or calor.  No drainage. C/W recent surgical procedure and healing  Gentle log roll was min tender  Thigh is swollen but supple and no increase in pain response was noted.  Calves are supple and apparently NT       Last Recorded Vitals  Blood pressure 166/72, pulse (!) 122, temperature 35.8 °C (96.5 °F), resp. rate 21, height 1.473 m (4' 10\"), weight 83.9 kg (185 lb), SpO2 92%.    Relevant Results      Scheduled medications  ammonium lactate, 1 Application, Topical, BID  ARIPiprazole, 5 mg, oral, q AM  aspirin, 325 mg, oral, BID  DULoxetine, 60 mg, oral, q AM  [Held by provider] enoxaparin, 40 mg, subcutaneous, q24h  ferrous sulfate (325 mg ferrous sulfate), 65 mg of iron, oral, Daily with breakfast  fluticasone, 2 spray, Each Nostril, q AM  levothyroxine, 100 mcg, oral, q AM  lidocaine, 1 patch, transdermal, Daily  LORazepam, 1 mg, oral, Nightly  montelukast, 10 mg, oral, q AM  polyethylene glycol, 17 g, oral, Daily  sulfamethoxazole-trimethoprim, 1 tablet, oral, BID  temazepam, 15 mg, oral, Nightly      Continuous medications     PRN medications  PRN medications: acetaminophen **OR** acetaminophen **OR** acetaminophen, acetaminophen **OR** acetaminophen **OR** acetaminophen, " dextromethorphan-guaifenesin, [DISCONTINUED] ondansetron **OR** ondansetron, ondansetron ODT, traMADol  Results for orders placed or performed during the hospital encounter of 11/12/24 (from the past 24 hours)   CBC   Result Value Ref Range    WBC 5.7 4.4 - 11.3 x10*3/uL    nRBC 0.0 0.0 - 0.0 /100 WBCs    RBC 3.31 (L) 4.00 - 5.20 x10*6/uL    Hemoglobin 10.8 (L) 12.0 - 16.0 g/dL    Hematocrit 34.1 (L) 36.0 - 46.0 %     (H) 80 - 100 fL    MCH 32.6 26.0 - 34.0 pg    MCHC 31.7 (L) 32.0 - 36.0 g/dL    RDW 14.6 (H) 11.5 - 14.5 %    Platelets 413 150 - 450 x10*3/uL   Basic metabolic panel   Result Value Ref Range    Glucose 88 74 - 99 mg/dL    Sodium 138 136 - 145 mmol/L    Potassium 4.1 3.5 - 5.3 mmol/L    Chloride 105 98 - 107 mmol/L    Bicarbonate 28 21 - 32 mmol/L    Anion Gap 9 (L) 10 - 20 mmol/L    Urea Nitrogen 13 6 - 23 mg/dL    Creatinine 0.69 0.50 - 1.05 mg/dL    eGFR >90 >60 mL/min/1.73m*2    Calcium 8.6 8.6 - 10.3 mg/dL        Assessment/Plan     Dr. Bautista requested further plan xrays and these are scheduled for this am.  Will defer to his management as he was the index surgeon.    Octavio Orlando MD

## 2024-11-14 NOTE — CARE PLAN
Problem: Nutrition  Goal: Oral intake greater 75%  Outcome: Progressing  Goal: Consume prescribed supplement  Outcome: Progressing  Goal: BG  mg/dL  Outcome: Progressing  Goal: Lab values WNL  Outcome: Progressing  Goal: Electrolytes WNL  Outcome: Progressing  Goal: Promote healing  Outcome: Progressing  Goal: Maintain stable weight  Outcome: Progressing     Problem: Skin  Goal: Decreased wound size/increased tissue granulation at next dressing change  Outcome: Progressing  Flowsheets (Taken 11/14/2024 1058)  Decreased wound size/increased tissue granulation at next dressing change: Promote sleep for wound healing  Goal: Participates in plan/prevention/treatment measures  Outcome: Progressing  Flowsheets (Taken 11/14/2024 1058)  Participates in plan/prevention/treatment measures: Increase activity/out of bed for meals  Goal: Prevent/manage excess moisture  Outcome: Progressing  Flowsheets (Taken 11/14/2024 1058)  Prevent/manage excess moisture: Moisturize dry skin  Goal: Prevent/minimize sheer/friction injuries  Outcome: Progressing  Flowsheets (Taken 11/14/2024 1058)  Prevent/minimize sheer/friction injuries: Use pull sheet  Goal: Promote/optimize nutrition  Outcome: Progressing  Flowsheets (Taken 11/14/2024 1058)  Promote/optimize nutrition: Consume > 50% meals/supplements  Goal: Promote skin healing  Outcome: Progressing  Flowsheets (Taken 11/14/2024 1058)  Promote skin healing: Turn/reposition every 2 hours/use positioning/transfer devices     Problem: Pain  Goal: Performs ADL's with improved pain control throughout shift  Outcome: Progressing  Goal: Participates in PT with improved pain control throughout the shift  Outcome: Progressing  Goal: Free from opioid side effects throughout the shift  Outcome: Progressing  Goal: Free from acute confusion related to pain meds throughout the shift  Outcome: Progressing     Problem: Pain - Adult  Goal: Verbalizes/displays adequate comfort level or baseline  comfort level  Outcome: Progressing     Problem: Safety - Adult  Goal: Free from fall injury  Outcome: Progressing     Problem: Discharge Planning  Goal: Discharge to home or other facility with appropriate resources  Outcome: Progressing     Problem: Chronic Conditions and Co-morbidities  Goal: Patient's chronic conditions and co-morbidity symptoms are monitored and maintained or improved  Outcome: Progressing   The patient's goals for the shift include use the call light    The clinical goals for the shift include no falls    Over the shift, the patient did not make progress toward the following goals. Barriers to progression include . Recommendations to address these barriers include .

## 2024-11-15 LAB
ANION GAP SERPL CALC-SCNC: 11 MMOL/L (ref 10–20)
BUN SERPL-MCNC: 16 MG/DL (ref 6–23)
CALCIUM SERPL-MCNC: 8.7 MG/DL (ref 8.6–10.3)
CHLORIDE SERPL-SCNC: 103 MMOL/L (ref 98–107)
CO2 SERPL-SCNC: 28 MMOL/L (ref 21–32)
CREAT SERPL-MCNC: 0.85 MG/DL (ref 0.5–1.05)
EGFRCR SERPLBLD CKD-EPI 2021: >90 ML/MIN/1.73M*2
ERYTHROCYTE [DISTWIDTH] IN BLOOD BY AUTOMATED COUNT: 14.8 % (ref 11.5–14.5)
GLUCOSE SERPL-MCNC: 95 MG/DL (ref 74–99)
HCT VFR BLD AUTO: 34 % (ref 36–46)
HEMOCCULT SP1 STL QL: NEGATIVE
HGB BLD-MCNC: 10.7 G/DL (ref 12–16)
MCH RBC QN AUTO: 32.1 PG (ref 26–34)
MCHC RBC AUTO-ENTMCNC: 31.5 G/DL (ref 32–36)
MCV RBC AUTO: 102 FL (ref 80–100)
NRBC BLD-RTO: 0 /100 WBCS (ref 0–0)
PLATELET # BLD AUTO: 412 X10*3/UL (ref 150–450)
POTASSIUM SERPL-SCNC: 4.6 MMOL/L (ref 3.5–5.3)
RBC # BLD AUTO: 3.33 X10*6/UL (ref 4–5.2)
SODIUM SERPL-SCNC: 137 MMOL/L (ref 136–145)
WBC # BLD AUTO: 5.8 X10*3/UL (ref 4.4–11.3)

## 2024-11-15 PROCEDURE — 2500000005 HC RX 250 GENERAL PHARMACY W/O HCPCS: Performed by: HOSPITALIST

## 2024-11-15 PROCEDURE — 2500000002 HC RX 250 W HCPCS SELF ADMINISTERED DRUGS (ALT 637 FOR MEDICARE OP, ALT 636 FOR OP/ED): Performed by: HOSPITALIST

## 2024-11-15 PROCEDURE — 85027 COMPLETE CBC AUTOMATED: CPT | Performed by: HOSPITALIST

## 2024-11-15 PROCEDURE — 2500000004 HC RX 250 GENERAL PHARMACY W/ HCPCS (ALT 636 FOR OP/ED): Performed by: HOSPITALIST

## 2024-11-15 PROCEDURE — 2500000001 HC RX 250 WO HCPCS SELF ADMINISTERED DRUGS (ALT 637 FOR MEDICARE OP): Performed by: INTERNAL MEDICINE

## 2024-11-15 PROCEDURE — 1100000001 HC PRIVATE ROOM DAILY

## 2024-11-15 PROCEDURE — 2500000001 HC RX 250 WO HCPCS SELF ADMINISTERED DRUGS (ALT 637 FOR MEDICARE OP): Performed by: HOSPITALIST

## 2024-11-15 PROCEDURE — 80048 BASIC METABOLIC PNL TOTAL CA: CPT | Performed by: HOSPITALIST

## 2024-11-15 PROCEDURE — 97165 OT EVAL LOW COMPLEX 30 MIN: CPT | Mod: GO

## 2024-11-15 PROCEDURE — 99232 SBSQ HOSP IP/OBS MODERATE 35: CPT | Performed by: HOSPITALIST

## 2024-11-15 PROCEDURE — 36415 COLL VENOUS BLD VENIPUNCTURE: CPT | Performed by: HOSPITALIST

## 2024-11-15 PROCEDURE — 97161 PT EVAL LOW COMPLEX 20 MIN: CPT | Mod: GP | Performed by: PHYSICAL THERAPIST

## 2024-11-15 ASSESSMENT — COGNITIVE AND FUNCTIONAL STATUS - GENERAL
TURNING FROM BACK TO SIDE WHILE IN FLAT BAD: A LOT
HELP NEEDED FOR BATHING: A LOT
CLIMB 3 TO 5 STEPS WITH RAILING: TOTAL
MOVING FROM LYING ON BACK TO SITTING ON SIDE OF FLAT BED WITH BEDRAILS: A LOT
DRESSING REGULAR UPPER BODY CLOTHING: A LOT
DRESSING REGULAR UPPER BODY CLOTHING: A LOT
PERSONAL GROOMING: A LOT
TURNING FROM BACK TO SIDE WHILE IN FLAT BAD: A LOT
PERSONAL GROOMING: A LOT
MOVING FROM LYING ON BACK TO SITTING ON SIDE OF FLAT BED WITH BEDRAILS: TOTAL
EATING MEALS: A LOT
DRESSING REGULAR LOWER BODY CLOTHING: A LOT
CLIMB 3 TO 5 STEPS WITH RAILING: TOTAL
STANDING UP FROM CHAIR USING ARMS: A LOT
TOILETING: A LOT
MOVING TO AND FROM BED TO CHAIR: TOTAL
CLIMB 3 TO 5 STEPS WITH RAILING: TOTAL
TOILETING: TOTAL
STANDING UP FROM CHAIR USING ARMS: A LOT
STANDING UP FROM CHAIR USING ARMS: TOTAL
DAILY ACTIVITIY SCORE: 12
MOVING TO AND FROM BED TO CHAIR: A LOT
MOVING FROM LYING ON BACK TO SITTING ON SIDE OF FLAT BED WITH BEDRAILS: A LOT
MOVING TO AND FROM BED TO CHAIR: A LOT
DRESSING REGULAR LOWER BODY CLOTHING: A LOT
MOBILITY SCORE: 11
MOBILITY SCORE: 6
DRESSING REGULAR LOWER BODY CLOTHING: TOTAL
HELP NEEDED FOR BATHING: A LOT
DAILY ACTIVITIY SCORE: 9
PERSONAL GROOMING: A LOT
EATING MEALS: A LOT
WALKING IN HOSPITAL ROOM: A LOT
MOBILITY SCORE: 11
WALKING IN HOSPITAL ROOM: TOTAL
WALKING IN HOSPITAL ROOM: A LOT
TOILETING: A LOT
DAILY ACTIVITIY SCORE: 12
TURNING FROM BACK TO SIDE WHILE IN FLAT BAD: TOTAL
HELP NEEDED FOR BATHING: TOTAL
DRESSING REGULAR UPPER BODY CLOTHING: A LOT
EATING MEALS: A LOT

## 2024-11-15 ASSESSMENT — PAIN DESCRIPTION - LOCATION
LOCATION: BACK
LOCATION: HIP
LOCATION: HIP

## 2024-11-15 ASSESSMENT — ACTIVITIES OF DAILY LIVING (ADL)
ADL_ASSISTANCE: NEEDS ASSISTANCE
BATHING_ASSISTANCE: TOTAL
ADL_ASSISTANCE: NEEDS ASSISTANCE

## 2024-11-15 ASSESSMENT — PAIN DESCRIPTION - ORIENTATION
ORIENTATION: LEFT
ORIENTATION: LOWER
ORIENTATION: LEFT

## 2024-11-15 ASSESSMENT — PAIN - FUNCTIONAL ASSESSMENT
PAIN_FUNCTIONAL_ASSESSMENT: 0-10
PAIN_FUNCTIONAL_ASSESSMENT: PAINAD (PAIN ASSESSMENT IN ADVANCED DEMENTIA SCALE)

## 2024-11-15 ASSESSMENT — PAIN SCALES - GENERAL
PAINLEVEL_OUTOF10: 4
PAINLEVEL_OUTOF10: 5 - MODERATE PAIN
PAINLEVEL_OUTOF10: 0 - NO PAIN
PAINLEVEL_OUTOF10: 5 - MODERATE PAIN
PAINLEVEL_OUTOF10: 0 - NO PAIN

## 2024-11-15 ASSESSMENT — PAIN DESCRIPTION - DESCRIPTORS
DESCRIPTORS: ACHING
DESCRIPTORS: ACHING

## 2024-11-15 ASSESSMENT — PAIN SCALES - WONG BAKER: WONGBAKER_NUMERICALRESPONSE: HURTS WHOLE LOT

## 2024-11-15 NOTE — PROGRESS NOTES
Occupational Therapy    Evaluation    Patient Name: Dee Jacob  MRN: [id number]  Today's Date: 11/15/2024  Time Calculation  Start Time: 1044  Stop Time: 1108   Time Calculation (min): 24 min   320/320-A    Assessment  IP OT Assessment  OT Assessment: Unsure of pt's PLOF for ADLs prior to hip fracture.  pt currently needing max A x 2 for all aspects of mobility and ADLs. pt would benefit from continued OT services as tolerated as pt may need another hip surgery in the future according to chart review.  pt does need max encouragement to participate yet is pleasant and appears cheerful at end of session.  Prognosis: Fair  Barriers to Discharge: Other (Comment) (possible need for hip surgery in the future)  Evaluation/Treatment Tolerance: Other (Comment) (pt needs max encouragement to particpate)  Medical Staff Made Aware: Yes  End of Session Communication: Bedside nurse, PCT/NA/CTA  End of Session Patient Position: Up in chair, Alarm on (jeferson pad under pt for safety)    Plan:  Treatment Interventions: ADL retraining, Functional transfer training, UE strengthening/ROM, Endurance training, Compensatory technique education  OT Frequency: 3 times per week  OT Discharge Recommendations: Low intensity level of continued care, Moderate intensity level of continued care  Equipment Recommended upon Discharge: Wheeled walker, Wheelchair  OT Recommended Transfer Status: Dependent  OT - OK to Discharge: Yes (once medically stable)    Subjective     Current Problem:  1. Acute urinary retention        2. Postoperative seroma of musculoskeletal structure after musculoskeletal procedure        3. Inability to ambulate due to hip            General:  General  Reason for Referral: 29 y.o. female presenting with generalized weakness.  Per home health nurse not safe to be at home.  She had recent left hip surgery (ORIF) 4 weeks ago with Dr Odonnell at [facility].  prior tto walking fine with a walker but recently developed  tremors and supposed to see neurology for that reason.  She has developed deconditioning weakness and needs higher level of care likely placement in skilled nursing rehab;   HOSPITALIST UPDATE 11/15/24: Orthopedic surgery on board  Discussed with Dr. Odonnell, orthopedic surgeon  He reviewed all images and recommended to follow as a routine outpatient  Patient will need surgical intervention in near future but not emergent or urgent during this hospital admission  Patient does not need to be transferred at this point in time.  He said that patient can go to skilled nursing facility rehab and weightbearing as tolerated  He will coordinate with family, plan of care moving forward  He is planning a specialized implant procedure for patient due to left hip fracture and failed hardware  With loss of fixation  Referred By: [place]  Past Medical History Relevant to Rehab: Down syndrome  Missed Visit: Yes  Missed Visit Reason: Other (Comment) (per chart review via Dr Mcdonald documentation: AP pelvis is suspicious for loss of fixation. Further studies are ordered. Dr. Bautista requested further plan xrays and these are scheduled for this am.  Will defer to his management as he was the index surgeon)  Family/Caregiver Present: No  Co-Treatment: PT (nursing and PCA also present to assist and encourage patient)  Co-Treatment Reason: to maximize patient safety with mobility  Prior to Session Communication: Bedside nurse  Patient Position Received: Up in chair, Alarm on  General Comment: patient agitated initially and not wanting therapist to put gait belt on her or move her blanket or TV remote away    Precautions:  LE Weight Bearing Status: Weight Bearing as Tolerated  Medical Precautions: Fall precautions    Vital Signs:  Vital Signs Comment: no concerns    Pain:  Pain Assessment  Pain Assessment: 0-10  0-10 (Numeric) Pain Score:  (unable to report number, did not cry or yell out while standing)    Objective     Cognition:  Overall  Cognitive Status: Impaired at baseline  Orientation Level: Unable to assess       Home Living:  Home Living Comments:  (lives with sister with level entry and no steps in home.  has FWW; no other info available as patient unable to give PLOF and no family present)     Prior Function:  Receives Help From: Family  ADL Assistance: Needs assistance  Homemaking Assistance: Needs assistance  Ambulatory Assistance: Needs assistance  Prior Function Comments: per chart review pt has daily aids 12 hours per day    ADL:  Eating Assistance: Moderate  Grooming Assistance: Total  Bathing Assistance: Total  UE Dressing Assistance: Maximal  LE Dressing Assistance: Total  Toileting Assistance with Device: Total      Bed Mobility/Transfers:   Bed Mobility  Bed Mobility: No  Transfers  Transfer: Yes  Transfer 1  Technique 1: Sit to stand, Stand to sit  Transfer Device 1: Walker, Gait belt  Transfer Level of Assistance 1: Maximum assistance, Maximum verbal cues, +2  Trials/Comments 1: on/off recliner x2; max encouragement to participate    Ambulation/Gait Training:  Functional Mobility  Functional Mobility Performed: No      Vision: Vision - Basic Assessment  Current Vision: Other (Comment) (unable to assess)      Strength:  Strength Comments: BUE estimated at 3+/5      Coordination:  Movements are Fluid and Coordinated: No     Outcome Measures: [facility] Daily Activity  Putting on and taking off regular lower body clothing: Total  Bathing (including washing, rinsing, drying): Total  Putting on and taking off regular upper body clothing: A lot  Toileting, which includes using toilet, bedpan or urinal: Total  Taking care of personal grooming such as brushing teeth: A lot  Eating Meals: A lot  Daily Activity - Total Score: 9                       EDUCATION:     Education Documentation  Body Mechanics, taught by Haley Mullins OT at 11/15/2024 12:25 PM.  Learner: Patient  Readiness: Nonacceptance  Method: Explanation, Demonstration  Response:  Needs Reinforcement  Comment: safe sit to stands    ADL Training, taught by Haley Mullins OT at 11/15/2024 12:25 PM.  Learner: Patient  Readiness: Nonacceptance  Method: Explanation, Demonstration  Response: Needs Reinforcement  Comment: safe sit to stands    Education Comments  No comments found.        Goals:   Encounter Problems       Encounter Problems (Active)       BALANCE       Patient will tolerate standing for 2 minutes with min A x 2  in order to allow for increased ease of caregiver assist during ADL.   (Progressing)       Start:  11/15/24    Expected End:  11/29/24               EXERCISE/STRENGTHENING       pt will participate in 10  minute BUE strength program (Progressing)       Start:  11/15/24    Expected End:  11/29/24               TRANSFERS       Patient will complete functional sit to stands with front wheeled walker with min A x 2 level of assistance. (Progressing)       Start:  11/15/24    Expected End:  11/29/24

## 2024-11-15 NOTE — CARE PLAN
Problem: Nutrition  Goal: Oral intake greater 75%  Outcome: Progressing  Goal: Consume prescribed supplement  Outcome: Progressing  Goal: BG  mg/dL  Outcome: Progressing  Goal: Lab values WNL  Outcome: Progressing  Goal: Electrolytes WNL  Outcome: Progressing  Goal: Promote healing  Outcome: Progressing  Goal: Maintain stable weight  Outcome: Progressing     Problem: Skin  Goal: Decreased wound size/increased tissue granulation at next dressing change  Outcome: Progressing  Flowsheets (Taken 11/15/2024 0759)  Decreased wound size/increased tissue granulation at next dressing change: Promote sleep for wound healing  Goal: Participates in plan/prevention/treatment measures  Outcome: Progressing  Flowsheets (Taken 11/15/2024 0759)  Participates in plan/prevention/treatment measures: Increase activity/out of bed for meals  Goal: Prevent/manage excess moisture  Outcome: Progressing  Flowsheets (Taken 11/15/2024 0759)  Prevent/manage excess moisture: Moisturize dry skin  Goal: Prevent/minimize sheer/friction injuries  Outcome: Progressing  Flowsheets (Taken 11/15/2024 0759)  Prevent/minimize sheer/friction injuries: Use pull sheet  Goal: Promote/optimize nutrition  Outcome: Progressing  Flowsheets (Taken 11/15/2024 0759)  Promote/optimize nutrition: Consume > 50% meals/supplements  Goal: Promote skin healing  Outcome: Progressing  Flowsheets (Taken 11/15/2024 0759)  Promote skin healing: Turn/reposition every 2 hours/use positioning/transfer devices     Problem: Pain  Goal: Performs ADL's with improved pain control throughout shift  Outcome: Progressing  Goal: Participates in PT with improved pain control throughout the shift  Outcome: Progressing  Goal: Free from opioid side effects throughout the shift  Outcome: Progressing  Goal: Free from acute confusion related to pain meds throughout the shift  Outcome: Progressing     Problem: Pain - Adult  Goal: Verbalizes/displays adequate comfort level or baseline  comfort level  Outcome: Progressing     Problem: Safety - Adult  Goal: Free from fall injury  Outcome: Progressing     Problem: Discharge Planning  Goal: Discharge to home or other facility with appropriate resources  Outcome: Progressing     Problem: Chronic Conditions and Co-morbidities  Goal: Patient's chronic conditions and co-morbidity symptoms are monitored and maintained or improved  Outcome: Progressing   The patient's goals for the shift include use the call light    The clinical goals for the shift include no falls    Over the shift, the patient did not make progress toward the following goals. Barriers to progression include . Recommendations to address these barriers include .

## 2024-11-15 NOTE — PROGRESS NOTES
Physical Therapy    Physical Therapy Evaluation    Patient Name: Dee Jacob  MRN: [id number]  Today's Date: 11/15/2024        Assessment/Plan  Skilled PT intervention is indicated due to patient presents with deficits in bed mobility, transfers, gait,  strength, activity tolerance, and safety awareness.   Patient globally weak and deconditioned.  She is obstinate and needs max encouragement to participate in PT evaluation.  Orthopedics anticipates patient needing surgical intervention for failed hardware of her hip but not immediately and anticipates this at an outpatient level.  Recommend continued physical therapy intervention at a low to moderate intensity to improve strength, balance, mobility and reduce fall risk as patient is able and willing to participate.  Continue transfers and ambulation with FWW as patient is able.    PT Assessment  PT Assessment Results: Decreased strength, Decreased endurance, Impaired balance, Decreased mobility, Decreased cognition, Decreased safety awareness, Obesity, Pain  Rehab Prognosis: Fair  Barriers to Discharge: needs significant assist for all mobility, unable to ambulate  Evaluation/Treatment Tolerance: Patient tolerated treatment well  Barriers to Participation: Comorbidities  End of Session Communication: Bedside nurse, PCT/NA/CTA  End of Session Patient Position: Up in chair, Alarm on  IP OR SWING BED PT PLAN  Inpatient or Swing Bed: Inpatient  PT Plan  Treatment/Interventions: Bed mobility, Transfer training, Gait training, Balance training, Therapeutic activity, Home exercise program, Therapeutic exercise  PT Plan: Ongoing PT  PT Frequency: 3 times per week  PT Discharge Recommendations: Moderate intensity level of continued care  Equipment Recommended upon Discharge: Wheeled walker, Wheelchair  PT Recommended Transfer Status: Assist x2  PT - OK to Discharge: Yes (once medically appropriate)    Subjective     Current Problem:  Patient Active Problem List    Diagnosis    Weakness    Acute urinary retention       General Visit Information:  General  Reason for Referral: impaired mobility; 29 y.o. female presenting with generalized weakness.  Per home health nurse not safe to be at home.  She had recent left hip surgery (ORIF) 4 weeks ago with Dr Odonnell at [facility].  prior tto walking fine with a walker but recently developed tremors and supposed to see neurology for that reason.  She has developed deconditioning weakness and needs higher level of care likely placement in skilled nursing rehab;   HOSPITALIST UPDATE 11/15/24: Orthopedic surgery on board  Discussed with Dr. Odonnell, orthopedic surgeon  He reviewed all images and recommended to follow as a routine outpatient  Patient will need surgical intervention in near future but not emergent or urgent during this hospital admission  Patient does not need to be transferred at this point in time.  He said that patient can go to skilled nursing facility rehab and weightbearing as tolerated  He will coordinate with family, plan of care moving forward  He is planning a specialized implant procedure for patient due to left hip fracture and failed hardware  With loss of fixation  Referred By: Maycol  Past Medical History Relevant to Rehab: Down syndrome  Missed Visit: Yes  Missed Visit Reason: Patient placed on medical hold (PT eval on hold until clarification of plan is received from orthopedic surgeon DR Odonnell per Dr castro note)  Family/Caregiver Present: No  Co-Treatment: OT (nursing and PCA also present to assist and encourage patient)  Co-Treatment Reason: to maximize patient safety with mobility  Prior to Session Communication: Bedside nurse  Patient Position Received: Up in chair, Alarm on  General Comment: patient agitated initially and not wanting therapist to put gait belt on her or move her blanket or TV remote away    Home Living:  Home Living  Home Living Comments: lives with sister with level entry and no  steps in home.  has FWW; no other info available as patient unable to give PLOF and no family present    Prior Level of Function:  Prior Function Per Pt/Caregiver Report  Receives Help From: Family (sibling)  ADL Assistance: Needs assistance  Homemaking Assistance: Needs assistance  Ambulatory Assistance: Needs assistance    Precautions:  Precautions  Medical Precautions: Fall precautions    Vital Signs:  Vital Signs  Vital Signs Comment: no concerns  Objective     Pain:  Pain Assessment  Pain Assessment: 0-10  0-10 (Numeric) Pain Score:  (unable to report number, did not cry or yell out while standing)    Cognition:  Cognition  Overall Cognitive Status: Impaired at baseline  Orientation Level: Unable to assess  Safety/Judgement: Exceptions to WFL  Insight: Severe    General Assessments:            Strength  Strength Comments: LEs grossly >/= 3/5           Static Sitting Balance  Static Sitting-Balance Support: Feet unsupported  Static Sitting-Level of Assistance: Contact guard, Minimum assistance  Static Standing Balance  Static Standing-Balance Support: Bilateral upper extremity supported  Static Standing-Level of Assistance: Moderate assistance  Static Standing-Comment/Number of Minutes: [id number]    Functional Assessments:     Bed Mobility  Bed Mobility: No  Transfers  Transfer: Yes  Transfer 1  Technique 1: Sit to stand, Stand to sit  Transfer Device 1: Gait belt (FWW)  Transfer Level of Assistance 1: Maximum assistance, Maximum verbal cues, +2  Trials/Comments 1: on/off recliner x2; max encouragement to participate  Ambulation/Gait Training  Ambulation/Gait Training Performed: No          Outcome Measures:  [facility] Basic Mobility  Turning from your back to your side while in a flat bed without using bedrails: Total  Moving from lying on your back to sitting on the side of a flat bed without using bedrails: Total  Moving to and from bed to chair (including a wheelchair): Total  Standing up from a chair using your  arms (e.g. wheelchair or bedside chair): Total  To walk in hospital room: Total  Climbing 3-5 steps with railing: Total  Basic Mobility - Total Score: 6                            Goals:  Encounter Problems       Encounter Problems (Active)       PT Problem       PT Goal 1       Start:  11/15/24    Expected End:  11/28/24       Dee Jacob will perform bed mobility for supine to and from sitting EOB with use of rail with modAx1           PT Goal 2       Start:  11/15/24    Expected End:  11/28/24       Dee Jacob will transfer sit to and from stand using least restrictive assistive device modAx1         PT Goal 3       Start:  11/15/24    Expected End:  11/28/24       Dee Jacob will transfer stand pivot using least restrictive assistive device modA x1         PT Goal 4       Start:  11/15/24    Expected End:  11/28/24       Dee Jacob will demonstrate good safety awareness with transfers and mobility and with use of assistive device (proper hand placement).               Pain - Adult            Education Documentation  Mobility Training, taught by Natasha Dawn, PT at 11/15/2024 12:00 PM.  Learner: Patient  Readiness: Acceptance  Method: Explanation, Demonstration  Response: Needs Reinforcement, No Evidence of Learning  Comment: mobility, safety    Education Comments  No comments found.

## 2024-11-15 NOTE — PROGRESS NOTES
"Dee Jacob is a 29 y.o. female on day 1 of admission presenting with Weakness.    Subjective   Nonverbal in no distress       Objective     Physical Exam  General Appearance: AAO x0  Skin: skin color pink, warm, and dry; no suspicious rashes or lesions  Eyes : PERRL, EOM's intact  ENT: mucous membranes pink and moist  Neck: normocephalic  Respiratory: lungs clear to auscultation anteriorly; no wheezing, rhonchi, or crackles.   Heart: regular rate and rhythm.   Abdomen: Nondistended, positive bowel sounds x4, soft,  nontender  Extremities: no edema, not cooperative to test range of motion extremities  Peripheral pulses: normal x4 extremities  Neuro: Awake, cannot follow commands  Last Recorded Vitals  Blood pressure 92/62, pulse 107, temperature 35.8 °C (96.4 °F), temperature source Temporal, resp. rate 18, height 1.473 m (4' 10\"), weight 83.9 kg (185 lb), SpO2 94%.  Intake/Output last 3 Shifts:  I/O last 3 completed shifts:  In: 680 (8.1 mL/kg) [P.O.:680]  Out: 1075 (12.8 mL/kg) [Urine:1075 (0.4 mL/kg/hr)]  Weight: 83.9 kg     Relevant Results  Scheduled medications  ammonium lactate, 1 Application, Topical, BID  ARIPiprazole, 5 mg, oral, q AM  aspirin, 325 mg, oral, BID  DULoxetine, 60 mg, oral, q AM  [Held by provider] enoxaparin, 40 mg, subcutaneous, q24h  ferrous sulfate (325 mg ferrous sulfate), 65 mg of iron, oral, Daily with breakfast  fluticasone, 2 spray, Each Nostril, q AM  levothyroxine, 100 mcg, oral, q AM  lidocaine, 1 patch, transdermal, Daily  LORazepam, 1 mg, oral, Nightly  montelukast, 10 mg, oral, q AM  polyethylene glycol, 17 g, oral, Daily  sulfamethoxazole-trimethoprim, 1 tablet, oral, BID  temazepam, 15 mg, oral, Nightly      Continuous medications     PRN medications  PRN medications: acetaminophen **OR** acetaminophen **OR** acetaminophen, acetaminophen **OR** acetaminophen **OR** acetaminophen, dextromethorphan-guaifenesin, morphine, [DISCONTINUED] ondansetron **OR** ondansetron, " ondansetron ODT, traMADol    Results for orders placed or performed during the hospital encounter of 11/12/24 (from the past 24 hours)   CBC   Result Value Ref Range    WBC 5.8 4.4 - 11.3 x10*3/uL    nRBC 0.0 0.0 - 0.0 /100 WBCs    RBC 3.33 (L) 4.00 - 5.20 x10*6/uL    Hemoglobin 10.7 (L) 12.0 - 16.0 g/dL    Hematocrit 34.0 (L) 36.0 - 46.0 %     (H) 80 - 100 fL    MCH 32.1 26.0 - 34.0 pg    MCHC 31.5 (L) 32.0 - 36.0 g/dL    RDW 14.8 (H) 11.5 - 14.5 %    Platelets 412 150 - 450 x10*3/uL   Basic metabolic panel   Result Value Ref Range    Glucose 95 74 - 99 mg/dL    Sodium 137 136 - 145 mmol/L    Potassium 4.6 3.5 - 5.3 mmol/L    Chloride 103 98 - 107 mmol/L    Bicarbonate 28 21 - 32 mmol/L    Anion Gap 11 10 - 20 mmol/L    Urea Nitrogen 16 6 - 23 mg/dL    Creatinine 0.85 0.50 - 1.05 mg/dL    eGFR >90 >60 mL/min/1.73m*2    Calcium 8.7 8.6 - 10.3 mg/dL                This patient has a urinary catheter   Reason for the urinary catheter remaining today? urinary retention/bladder outlet obstruction, acute or chronic               Assessment/Plan   Assessment & Plan  Weakness    Acute urinary retention      29-year-old female with  Recent history of left hip surgery and failed hardware, possible new fracture  Postop seroma  Down syndrome  This is cellulitis on antibiotics  Anemia  Generalized weakness, deconditioning  Ambulatory dysfunction  Urinary retention status post Dempsey  Possible cystitis  Plan  Ortho surgery was made aware and recommended outpatient follow-up for procedure intervention  Pain control as required  Discussed with family, try to avoid narcotics as best possible and use minimally even if required  Follow case management for placement to rehab  CT lumbar spine no acute process  To follow neurology outpatient for nerve conduction study  Tylenol, ibuprofen, lidocaine patch  Hydrate  Encourage nutrition  Aspirin 325 mg p.o. twice daily for DVT prophylaxis  On montelukast  On Bactrim from  outpatient  Synthroid for hypothyroidism  To follow urology outpatient, Dempsey  Iron supplement for anemia  Stool FOBT negative  Continue benzodiazepines carefully  Disposition when arrangements are made  Daily CBC BMP and follow vitals, currently hemodynamically stable                               Valerie Severino MD

## 2024-11-15 NOTE — PROGRESS NOTES
Patient was able to have PT/OT evals today.  spoke with patient's sister to review discharge plan and Medicare IM. Patient's sister/Svetlana agreeable to referrals being sent to all [facility] SNFs not including [facility] or [facility]. SW/DSC to attach/send same via [employer]Port.  - 1635: [facility] is considering, will need to confirm with their transport re: patient's surgical follow-up appointments--won't be able to confirm before Monday. [facility] is accepting.  - 1735: SW phoned and updated patient's SSA through the Board of [employer]/Denise Kang ([phone]). Plan for patient TBD: SNF pending acceptance, qualifying 3-midnight inpatient stay--by Sunday 11/17. HENS 7000 if possible; If PASRR required, same will likely initiate a Level 2 evaluation prior to approval. Care Transitions to follow and assist. MAGGIE Gonsales

## 2024-11-15 NOTE — PROGRESS NOTES
"Dee Jacob is a 29 y.o. female on day 0 of admission presenting with Weakness.    Subjective   Patient nonverbal noncommunicative with Down syndrome   Does not seem in distress at present time    Objective     Physical Exam  General Appearance: AAO x0  Skin: skin color pink, warm, and dry; no suspicious rashes or lesions  Eyes : PERRL, EOM's intact  ENT: mucous membranes pink and moist  Neck: normocephalic  Respiratory: lungs clear to auscultation anteriorly; no wheezing, rhonchi, or crackles.   Heart: regular rate and rhythm.   Abdomen: Nondistended, positive bowel sounds x4, soft,  nontender  Extremities: no edema, not cooperative to test range of motion extremities  Peripheral pulses: normal x4 extremities  Neuro: Awake, cannot follow commands  Last Recorded Vitals  Blood pressure 108/67, pulse 101, temperature 36.7 °C (98 °F), resp. rate 20, height 1.473 m (4' 10\"), weight 83.9 kg (185 lb), SpO2 91%.  Intake/Output last 3 Shifts:  I/O last 3 completed shifts:  In: 1520 (18.1 mL/kg) [P.O.:1520]  Out: 900 (10.7 mL/kg) [Urine:900 (0.3 mL/kg/hr)]  Weight: 83.9 kg     Relevant Results    Scheduled medications  ammonium lactate, 1 Application, Topical, BID  ARIPiprazole, 5 mg, oral, q AM  aspirin, 325 mg, oral, BID  DULoxetine, 60 mg, oral, q AM  [Held by provider] enoxaparin, 40 mg, subcutaneous, q24h  ferrous sulfate (325 mg ferrous sulfate), 65 mg of iron, oral, Daily with breakfast  fluticasone, 2 spray, Each Nostril, q AM  levothyroxine, 100 mcg, oral, q AM  lidocaine, 1 patch, transdermal, Daily  LORazepam, 1 mg, oral, Nightly  montelukast, 10 mg, oral, q AM  polyethylene glycol, 17 g, oral, Daily  sulfamethoxazole-trimethoprim, 1 tablet, oral, BID  temazepam, 15 mg, oral, Nightly      Continuous medications     PRN medications  PRN medications: acetaminophen **OR** acetaminophen **OR** acetaminophen, acetaminophen **OR** acetaminophen **OR** acetaminophen, dextromethorphan-guaifenesin, [DISCONTINUED] " ondansetron **OR** ondansetron, ondansetron ODT, traMADol    Results for orders placed or performed during the hospital encounter of 11/12/24 (from the past 24 hours)   CBC   Result Value Ref Range    WBC 5.7 4.4 - 11.3 x10*3/uL    nRBC 0.0 0.0 - 0.0 /100 WBCs    RBC 3.31 (L) 4.00 - 5.20 x10*6/uL    Hemoglobin 10.8 (L) 12.0 - 16.0 g/dL    Hematocrit 34.1 (L) 36.0 - 46.0 %     (H) 80 - 100 fL    MCH 32.6 26.0 - 34.0 pg    MCHC 31.7 (L) 32.0 - 36.0 g/dL    RDW 14.6 (H) 11.5 - 14.5 %    Platelets 413 150 - 450 x10*3/uL   Basic metabolic panel   Result Value Ref Range    Glucose 88 74 - 99 mg/dL    Sodium 138 136 - 145 mmol/L    Potassium 4.1 3.5 - 5.3 mmol/L    Chloride 105 98 - 107 mmol/L    Bicarbonate 28 21 - 32 mmol/L    Anion Gap 9 (L) 10 - 20 mmol/L    Urea Nitrogen 13 6 - 23 mg/dL    Creatinine 0.69 0.50 - 1.05 mg/dL    eGFR >90 >60 mL/min/1.73m*2    Calcium 8.6 8.6 - 10.3 mg/dL       CT lumbar spine wo IV contrast    Result Date: 11/14/2024  Interpreted By:  Charly Hearn, STUDY: L spine  CT LUMBAR SPINE WO IV CONTRAST;  11/13/2024 6:47 pm   INDICATION: Signs/Symptoms:pain.   COMPARISON: None.   ACCESSION NUMBER(S): [id number]   ORDERING CLINICIAN: KEMAR WHITLEY   TECHNIQUE: CT examination was performed throughout the lumbar spine. Multiplanar reconstructions were made.   FINDINGS: Alignment and vertebral body height are normal. Bone density is normal. There is no evidence of canal or foraminal narrowing.   At L3/L4, there are minor degenerative endplate changes to the left of midline including Schmorl's node formation, narrowing of the intervertebral disc space and bony sclerosis. Facet joints are normal. Visualized sacrum is normal. Visualized paraspinal soft tissues are normal.       Minor discogenic changes at L3/L4   No evidence of bony canal stenosis or bony foraminal narrowing.   No evidence of fracture or subluxation. No evidence of benign or malignant neoplasm.   MACRO: none   Signed by:  Charly Hearn 11/14/2024 8:37 AM Dictation workstation:   [id number]    XR hip left with pelvis when performed 2 or 3 views    Result Date: 11/14/2024  Interpreted By:  Jayden Vazquez, STUDY: XR HIP LEFT WITH PELVIS WHEN PERFORMED 2 OR 3 VIEWS; ;  11/14/2024 8:01 am   INDICATION: Signs/Symptoms:pain.     COMPARISON: 11/12/2024   ACCESSION NUMBER(S): [id number]   ORDERING CLINICIAN: KEMAR WHITLEY   FINDINGS: PELVIS AP, LEFT HIP AP AND LATERAL No change is seen, in the position of the comminuted fracture fragments in the intertrochanteric region. Callus formation is present. The femoral shaft is displaced laterally and anteriorly in relation to the femoral neck. The lateral view is suboptimal. The intertrochanteric compression screw is intact. The lateral plate along the femoral proximal shaft is displaced slightly laterally but unchanged from 11/12/2024. The pelvic bones and right hip joint are intact.       Comminuted intertrochanteric fracture of the left femur, with hardware present. Callus formation seen but new fracture superimposed on old fracture is not excluded. No change from 11/12/2024.     MACRO: None   Signed by: Jayden Vazquez 11/14/2024 8:19 AM Dictation workstation:   [id number]                        Assessment/Plan   Assessment & Plan  Weakness    Acute urinary retention      29-year-old female with  Recent history of left hip surgery with open reduction internal fixation of proximal left femur  Postop seroma send Down syndrome  Recent cellulitis, bedsore on antibiotics  Anemia  Generalized weakness, deconditioned  Ambulatory dysfunction  Retention status post Dempsey  Possible cystitis  Plan  Orthopedic surgery on board  Discussed with Dr. Odonnell, orthopedic surgeon  He reviewed all images and recommended to follow as a routine outpatient  Patient will need surgical intervention in near future but not emergent or urgent during this hospital admission  Patient does not need to be transferred  at this point in time.  He said that patient can go to skilled nursing facility rehab and weightbearing as tolerated  He will coordinate with family, plan of care moving forward  He is planning a specialized implant procedure for patient due to left hip fracture and failed hardware  With loss of fixation  Continue pain control, currently stable and not in distress  CT lumbar spine no acute process  To follow neurology outpatient  Tylenol, ibuprofen, lidocaine patch  Hydrate  Encourage nutrition  Aspirin 325 mg p.o. daily for DVT prophylaxis  On montelukast  Continue Bactrim for infection as outpatient regimen  Monitor for any signs of sepsis  Synthroid for hypothyroidism  To follow urology outpatient, Dempsey as required  Iron supplement for anemia  On Ativan, Cymbalta, Abilify for psych issues  Also on temazepam  To watch mentation and vitals, ABCs  Follow daily labs CBC BMP  Follow case management for placement to skilled nursing facility rehab                                     Valerie Severino MD

## 2024-11-16 LAB
ANION GAP SERPL CALC-SCNC: 9 MMOL/L (ref 10–20)
BACTERIA BLD CULT: NORMAL
BACTERIA BLD CULT: NORMAL
BUN SERPL-MCNC: 14 MG/DL (ref 6–23)
CALCIUM SERPL-MCNC: 8.3 MG/DL (ref 8.6–10.3)
CHLORIDE SERPL-SCNC: 102 MMOL/L (ref 98–107)
CO2 SERPL-SCNC: 29 MMOL/L (ref 21–32)
CREAT SERPL-MCNC: 0.74 MG/DL (ref 0.5–1.05)
EGFRCR SERPLBLD CKD-EPI 2021: >90 ML/MIN/1.73M*2
ERYTHROCYTE [DISTWIDTH] IN BLOOD BY AUTOMATED COUNT: 14.9 % (ref 11.5–14.5)
GLUCOSE SERPL-MCNC: 88 MG/DL (ref 74–99)
HCT VFR BLD AUTO: 33.9 % (ref 36–46)
HGB BLD-MCNC: 10.5 G/DL (ref 12–16)
MCH RBC QN AUTO: 32.1 PG (ref 26–34)
MCHC RBC AUTO-ENTMCNC: 31 G/DL (ref 32–36)
MCV RBC AUTO: 104 FL (ref 80–100)
NRBC BLD-RTO: 0 /100 WBCS (ref 0–0)
PLATELET # BLD AUTO: 376 X10*3/UL (ref 150–450)
POTASSIUM SERPL-SCNC: 4.1 MMOL/L (ref 3.5–5.3)
RBC # BLD AUTO: 3.27 X10*6/UL (ref 4–5.2)
SODIUM SERPL-SCNC: 136 MMOL/L (ref 136–145)
WBC # BLD AUTO: 6.4 X10*3/UL (ref 4.4–11.3)

## 2024-11-16 PROCEDURE — 2500000001 HC RX 250 WO HCPCS SELF ADMINISTERED DRUGS (ALT 637 FOR MEDICARE OP): Performed by: HOSPITALIST

## 2024-11-16 PROCEDURE — 36415 COLL VENOUS BLD VENIPUNCTURE: CPT | Performed by: HOSPITALIST

## 2024-11-16 PROCEDURE — 99232 SBSQ HOSP IP/OBS MODERATE 35: CPT | Performed by: HOSPITALIST

## 2024-11-16 PROCEDURE — 2500000001 HC RX 250 WO HCPCS SELF ADMINISTERED DRUGS (ALT 637 FOR MEDICARE OP): Performed by: INTERNAL MEDICINE

## 2024-11-16 PROCEDURE — 2500000002 HC RX 250 W HCPCS SELF ADMINISTERED DRUGS (ALT 637 FOR MEDICARE OP, ALT 636 FOR OP/ED): Performed by: HOSPITALIST

## 2024-11-16 PROCEDURE — 2500000004 HC RX 250 GENERAL PHARMACY W/ HCPCS (ALT 636 FOR OP/ED): Performed by: HOSPITALIST

## 2024-11-16 PROCEDURE — 80048 BASIC METABOLIC PNL TOTAL CA: CPT | Performed by: HOSPITALIST

## 2024-11-16 PROCEDURE — 85027 COMPLETE CBC AUTOMATED: CPT | Performed by: HOSPITALIST

## 2024-11-16 PROCEDURE — 2500000005 HC RX 250 GENERAL PHARMACY W/O HCPCS: Performed by: HOSPITALIST

## 2024-11-16 PROCEDURE — 1100000001 HC PRIVATE ROOM DAILY

## 2024-11-16 ASSESSMENT — COGNITIVE AND FUNCTIONAL STATUS - GENERAL
PERSONAL GROOMING: A LOT
DRESSING REGULAR UPPER BODY CLOTHING: A LOT
CLIMB 3 TO 5 STEPS WITH RAILING: A LOT
MOBILITY SCORE: 12
MOVING FROM LYING ON BACK TO SITTING ON SIDE OF FLAT BED WITH BEDRAILS: A LOT
MOVING TO AND FROM BED TO CHAIR: A LOT
WALKING IN HOSPITAL ROOM: A LOT
DAILY ACTIVITIY SCORE: 12
TURNING FROM BACK TO SIDE WHILE IN FLAT BAD: A LOT
WALKING IN HOSPITAL ROOM: A LOT
STANDING UP FROM CHAIR USING ARMS: A LOT
TOILETING: A LOT
MOVING FROM LYING ON BACK TO SITTING ON SIDE OF FLAT BED WITH BEDRAILS: A LOT
TURNING FROM BACK TO SIDE WHILE IN FLAT BAD: A LOT
CLIMB 3 TO 5 STEPS WITH RAILING: A LOT
DRESSING REGULAR LOWER BODY CLOTHING: A LOT
STANDING UP FROM CHAIR USING ARMS: A LOT
MOVING TO AND FROM BED TO CHAIR: A LOT
HELP NEEDED FOR BATHING: A LOT
MOBILITY SCORE: 12
EATING MEALS: A LOT

## 2024-11-16 ASSESSMENT — PAIN SCALES - GENERAL
PAINLEVEL_OUTOF10: 0 - NO PAIN
PAINLEVEL_OUTOF10: 0 - NO PAIN

## 2024-11-16 ASSESSMENT — PAIN SCALES - WONG BAKER: WONGBAKER_NUMERICALRESPONSE: HURTS LITTLE BIT

## 2024-11-16 ASSESSMENT — PAIN - FUNCTIONAL ASSESSMENT: PAIN_FUNCTIONAL_ASSESSMENT: 0-10

## 2024-11-16 NOTE — PROGRESS NOTES
Pt reviewed during Care Rounds today and she is nearing discharge.  So far she has only been accepted by [facility].  Call placed to pt's sister/Svetlana Mir to discuss the same.  She looked into these two facilities and is not satisfied with either.  We reviewed potential accepting facilities and sister was encouraged to consider some of these, as well.  She is aware both [facility] and [facility] do not have beds, but we can check back on Monday.  Sister is also requesting we check back with [facility] on Monday. Care Transitions will continue to follow.     BOB Ochoa

## 2024-11-16 NOTE — PROGRESS NOTES
"Dee Jacob is a 29 y.o. female on day 2 of admission presenting with Weakness.    Subjective   Patient sitting in pleasant mood today       Objective     Physical Exam  General Appearance: AAO x0  Skin: skin color pink, warm, and dry; no suspicious rashes or lesions  Eyes : PERRL, EOM's intact  ENT: mucous membranes pink and moist  Neck: normocephalic  Respiratory: lungs clear to auscultation anteriorly; no wheezing, rhonchi, or crackles.   Heart: regular rate and rhythm.   Abdomen: Nondistended, positive bowel sounds x4, soft,  nontender  Extremities: no edema, not cooperative to test range of motion extremities  Peripheral pulses: normal x4 extremities  Neuro: Awake, cannot follow commands  Last Recorded Vitals  Blood pressure 112/70, pulse 103, temperature 36.4 °C (97.5 °F), temperature source Temporal, resp. rate 16, height 1.473 m (4' 10\"), weight 83.9 kg (185 lb), SpO2 95%.  Intake/Output last 3 Shifts:  I/O last 3 completed shifts:  In: 960 (11.4 mL/kg) [P.O.:960]  Out: 1450 (17.3 mL/kg) [Urine:1450 (0.5 mL/kg/hr)]  Weight: 83.9 kg     Relevant Results    Scheduled medications  ammonium lactate, 1 Application, Topical, BID  ARIPiprazole, 5 mg, oral, q AM  aspirin, 325 mg, oral, BID  DULoxetine, 60 mg, oral, q AM  [Held by provider] enoxaparin, 40 mg, subcutaneous, q24h  ferrous sulfate (325 mg ferrous sulfate), 65 mg of iron, oral, Daily with breakfast  fluticasone, 2 spray, Each Nostril, q AM  levothyroxine, 100 mcg, oral, q AM  lidocaine, 1 patch, transdermal, Daily  LORazepam, 1 mg, oral, Nightly  montelukast, 10 mg, oral, q AM  polyethylene glycol, 17 g, oral, Daily  sulfamethoxazole-trimethoprim, 1 tablet, oral, BID  temazepam, 15 mg, oral, Nightly      Continuous medications     PRN medications  PRN medications: acetaminophen **OR** acetaminophen **OR** acetaminophen, acetaminophen **OR** acetaminophen **OR** acetaminophen, dextromethorphan-guaifenesin, morphine, [DISCONTINUED] ondansetron " **OR** ondansetron, ondansetron ODT, traMADol    Results for orders placed or performed during the hospital encounter of 11/12/24 (from the past 24 hours)   CBC   Result Value Ref Range    WBC 6.4 4.4 - 11.3 x10*3/uL    nRBC 0.0 0.0 - 0.0 /100 WBCs    RBC 3.27 (L) 4.00 - 5.20 x10*6/uL    Hemoglobin 10.5 (L) 12.0 - 16.0 g/dL    Hematocrit 33.9 (L) 36.0 - 46.0 %     (H) 80 - 100 fL    MCH 32.1 26.0 - 34.0 pg    MCHC 31.0 (L) 32.0 - 36.0 g/dL    RDW 14.9 (H) 11.5 - 14.5 %    Platelets 376 150 - 450 x10*3/uL   Basic metabolic panel   Result Value Ref Range    Glucose 88 74 - 99 mg/dL    Sodium 136 136 - 145 mmol/L    Potassium 4.1 3.5 - 5.3 mmol/L    Chloride 102 98 - 107 mmol/L    Bicarbonate 29 21 - 32 mmol/L    Anion Gap 9 (L) 10 - 20 mmol/L    Urea Nitrogen 14 6 - 23 mg/dL    Creatinine 0.74 0.50 - 1.05 mg/dL    eGFR >90 >60 mL/min/1.73m*2    Calcium 8.3 (L) 8.6 - 10.3 mg/dL                             Assessment/Plan   Assessment & Plan  Weakness    Acute urinary retention        33-year-old female with  Recent history of on left hip surgery with failed hardware, possible new fracture  Postop seroma  Down syndrome  Previous cellulitis possibly on antibiotics from outpatient  Anemia  Generalized weakness, deconditioning  Ambulatory dysfunction  Urine retention status post Dempsey  Possible cystitis  Plan  Ortho surgery to follow outpatient per discussion with them  Follow case management for SNF placement  Pain control optimally with Tylenol, lidocaine patch, ibuprofen  CT lumbar spine no acute process  Hydrate  Encourage nutrition  On aspirin 325 mg p.o. twice daily for DVT prophylaxis  Montelukast  On Bactrim from outpatient, can DC tomorrow  Iron supplement for anemia, stool FOBT negative  Synthroid for hypothyroidism  Follow urology outpatient, Dempsey  Continue benzodiazepines carefully  Daily CBC BMP and follow vitals  Currently dynamically stable  Supportive care symptomatic management, Flonase  spray  Continue psych medicines  DC when arrangements are made for placement to SNF                               Valerie Severino MD

## 2024-11-16 NOTE — PROGRESS NOTES
Physical Therapy                 Therapy Communication Note    Patient Name: Dee Jacob  MRN: [id number]  Department: [facility]  Room: [address] [place], [place] [address]  Today's Date: 11/16/2024     Discipline: Physical Therapy    Missed Visit Reason: Missed Visit Reason: Patient sleeping    Missed Time: Attempt    Comment: Entering room for PT treatment with patient sleeping in chair. Attempting to awaken, patient opens her eyes and continues to close them even with multiple verbal prompting for participation.

## 2024-11-16 NOTE — CARE PLAN
Problem: Pain - Adult  Goal: Verbalizes/displays adequate comfort level or baseline comfort level  Outcome: Met     Problem: Safety - Adult  Goal: Free from fall injury  Outcome: Met     Problem: Chronic Conditions and Co-morbidities  Goal: Patient's chronic conditions and co-morbidity symptoms are monitored and maintained or improved  Outcome: Met   The patient's goals for the shift include use the call light    The clinical goals for the shift include no falls and remain comfortable throughout shift.

## 2024-11-17 VITALS
OXYGEN SATURATION: 94 % | TEMPERATURE: 96.3 F | RESPIRATION RATE: 18 BRPM | WEIGHT: 185 LBS | BODY MASS INDEX: 38.83 KG/M2 | DIASTOLIC BLOOD PRESSURE: 64 MMHG | HEIGHT: 58 IN | HEART RATE: 122 BPM | SYSTOLIC BLOOD PRESSURE: 106 MMHG

## 2024-11-17 LAB
ANION GAP SERPL CALC-SCNC: 8 MMOL/L (ref 10–20)
BUN SERPL-MCNC: 22 MG/DL (ref 6–23)
CALCIUM SERPL-MCNC: 9 MG/DL (ref 8.6–10.3)
CHLORIDE SERPL-SCNC: 101 MMOL/L (ref 98–107)
CO2 SERPL-SCNC: 31 MMOL/L (ref 21–32)
CREAT SERPL-MCNC: 0.88 MG/DL (ref 0.5–1.05)
EGFRCR SERPLBLD CKD-EPI 2021: 90 ML/MIN/1.73M*2
ERYTHROCYTE [DISTWIDTH] IN BLOOD BY AUTOMATED COUNT: 14.8 % (ref 11.5–14.5)
GLUCOSE SERPL-MCNC: 93 MG/DL (ref 74–99)
HCT VFR BLD AUTO: 34.2 % (ref 36–46)
HGB BLD-MCNC: 10.8 G/DL (ref 12–16)
MCH RBC QN AUTO: 32.2 PG (ref 26–34)
MCHC RBC AUTO-ENTMCNC: 31.6 G/DL (ref 32–36)
MCV RBC AUTO: 102 FL (ref 80–100)
NRBC BLD-RTO: 0 /100 WBCS (ref 0–0)
PLATELET # BLD AUTO: 400 X10*3/UL (ref 150–450)
POTASSIUM SERPL-SCNC: 4.3 MMOL/L (ref 3.5–5.3)
RBC # BLD AUTO: 3.35 X10*6/UL (ref 4–5.2)
SODIUM SERPL-SCNC: 136 MMOL/L (ref 136–145)
WBC # BLD AUTO: 7.3 X10*3/UL (ref 4.4–11.3)

## 2024-11-17 PROCEDURE — 2500000004 HC RX 250 GENERAL PHARMACY W/ HCPCS (ALT 636 FOR OP/ED): Performed by: HOSPITALIST

## 2024-11-17 PROCEDURE — 2500000005 HC RX 250 GENERAL PHARMACY W/O HCPCS: Performed by: HOSPITALIST

## 2024-11-17 PROCEDURE — 82374 ASSAY BLOOD CARBON DIOXIDE: CPT | Performed by: HOSPITALIST

## 2024-11-17 PROCEDURE — 85027 COMPLETE CBC AUTOMATED: CPT | Performed by: HOSPITALIST

## 2024-11-17 PROCEDURE — 36415 COLL VENOUS BLD VENIPUNCTURE: CPT | Performed by: HOSPITALIST

## 2024-11-17 PROCEDURE — 2500000002 HC RX 250 W HCPCS SELF ADMINISTERED DRUGS (ALT 637 FOR MEDICARE OP, ALT 636 FOR OP/ED): Performed by: HOSPITALIST

## 2024-11-17 PROCEDURE — 2500000001 HC RX 250 WO HCPCS SELF ADMINISTERED DRUGS (ALT 637 FOR MEDICARE OP): Performed by: INTERNAL MEDICINE

## 2024-11-17 PROCEDURE — 2500000001 HC RX 250 WO HCPCS SELF ADMINISTERED DRUGS (ALT 637 FOR MEDICARE OP): Performed by: HOSPITALIST

## 2024-11-17 PROCEDURE — 99232 SBSQ HOSP IP/OBS MODERATE 35: CPT | Performed by: HOSPITALIST

## 2024-11-17 PROCEDURE — 1100000001 HC PRIVATE ROOM DAILY

## 2024-11-17 RX ORDER — CYCLOBENZAPRINE HCL 10 MG
10 TABLET ORAL ONCE
Status: COMPLETED | OUTPATIENT
Start: 2024-11-17 | End: 2024-11-17

## 2024-11-17 SDOH — HEALTH STABILITY: PHYSICAL HEALTH: ON AVERAGE, HOW MANY MINUTES DO YOU ENGAGE IN EXERCISE AT THIS LEVEL?: 0 MIN

## 2024-11-17 SDOH — ECONOMIC STABILITY: FOOD INSECURITY: HOW HARD IS IT FOR YOU TO PAY FOR THE VERY BASICS LIKE FOOD, HOUSING, MEDICAL CARE, AND HEATING?: NOT HARD AT ALL

## 2024-11-17 SDOH — HEALTH STABILITY: PHYSICAL HEALTH: ON AVERAGE, HOW MANY DAYS PER WEEK DO YOU ENGAGE IN MODERATE TO STRENUOUS EXERCISE (LIKE A BRISK WALK)?: 0 DAYS

## 2024-11-17 ASSESSMENT — PAIN DESCRIPTION - LOCATION
LOCATION: BACK
LOCATION: HIP

## 2024-11-17 ASSESSMENT — PAIN SCALES - GENERAL: PAINLEVEL_OUTOF10: 5 - MODERATE PAIN

## 2024-11-17 ASSESSMENT — PAIN DESCRIPTION - ORIENTATION: ORIENTATION: LEFT

## 2024-11-17 ASSESSMENT — ACTIVITIES OF DAILY LIVING (ADL): LACK_OF_TRANSPORTATION: NO

## 2024-11-17 ASSESSMENT — PAIN SCALES - WONG BAKER
WONGBAKER_NUMERICALRESPONSE: HURTS WORST
WONGBAKER_NUMERICALRESPONSE: HURTS WHOLE LOT
WONGBAKER_NUMERICALRESPONSE: HURTS EVEN MORE

## 2024-11-17 NOTE — CARE PLAN
The patient's goals for the shift include use the call light    The clinical goals for the shift include Monitor labs and v/s and prn pain rx    Over the shift, the patient did not make progress toward the following goals. Barriers to progression include cognitive disabilities.  Recommendations to address these barriers include communication with family/POA.      Problem: Skin  Goal: Decreased wound size/increased tissue granulation at next dressing change  Flowsheets (Taken 11/17/2024 0147)  Decreased wound size/increased tissue granulation at next dressing change: Promote sleep for wound healing  Goal: Promote skin healing  Outcome: Progressing     Problem: Skin  Goal: Promote skin healing  Outcome: Progressing     Problem: Pain  Goal: Free from opioid side effects throughout the shift  Outcome: Progressing     Problem: Discharge Planning  Goal: Discharge to home or other facility with appropriate resources  Flowsheets (Taken 11/17/2024 0147)  Discharge to home or other facility with appropriate resources: Identify barriers to discharge with patient and caregiver

## 2024-11-17 NOTE — PROGRESS NOTES
"Dee Jacob is a 29 y.o. female on day 3 of admission presenting with Weakness.    Subjective   Patient not able to communicate verbally  Does not seem to be in severe distress at present   No nausea vomiting diarrhea or fever chills  No bleeding apparently      Objective     Physical Exam  General Appearance: AAO x0  Skin: skin color pink, warm, and dry; no suspicious rashes or lesions  Eyes : PERRL, EOM's intact  ENT: mucous membranes pink and moist  Neck: normocephalic  Respiratory: lungs clear to auscultation anteriorly; no wheezing, rhonchi, or crackles.   Heart: regular rate and rhythm.   Abdomen: Nondistended, positive bowel sounds x4, soft,  nontender  Extremities: no edema, not cooperative to test range of motion extremities  Peripheral pulses: normal x4 extremities  Neuro: Awake, cannot follow commands  Last Recorded Vitals  Blood pressure 115/79, pulse 95, temperature 36.5 °C (97.7 °F), temperature source Temporal, resp. rate 16, height 1.473 m (4' 10\"), weight 83.9 kg (185 lb), SpO2 96%.  Intake/Output last 3 Shifts:  I/O last 3 completed shifts:  In: 1060 (12.6 mL/kg) [P.O.:1060]  Out: 2150 (25.6 mL/kg) [Urine:2150 (0.7 mL/kg/hr)]  Weight: 83.9 kg     Relevant Results    Scheduled medications  ammonium lactate, 1 Application, Topical, BID  ARIPiprazole, 5 mg, oral, q AM  aspirin, 325 mg, oral, BID  DULoxetine, 60 mg, oral, q AM  [Held by provider] enoxaparin, 40 mg, subcutaneous, q24h  ferrous sulfate (325 mg ferrous sulfate), 65 mg of iron, oral, Daily with breakfast  fluticasone, 2 spray, Each Nostril, q AM  levothyroxine, 100 mcg, oral, q AM  lidocaine, 1 patch, transdermal, Daily  LORazepam, 1 mg, oral, Nightly  montelukast, 10 mg, oral, q AM  polyethylene glycol, 17 g, oral, Daily  temazepam, 15 mg, oral, Nightly      Continuous medications     PRN medications  PRN medications: acetaminophen **OR** acetaminophen **OR** acetaminophen, acetaminophen **OR** acetaminophen **OR** acetaminophen, " dextromethorphan-guaifenesin, morphine, [DISCONTINUED] ondansetron **OR** ondansetron, ondansetron ODT, traMADol               Results for orders placed or performed during the hospital encounter of 11/12/24 (from the past 24 hours)   Basic metabolic panel   Result Value Ref Range    Glucose 93 74 - 99 mg/dL    Sodium 136 136 - 145 mmol/L    Potassium 4.3 3.5 - 5.3 mmol/L    Chloride 101 98 - 107 mmol/L    Bicarbonate 31 21 - 32 mmol/L    Anion Gap 8 (L) 10 - 20 mmol/L    Urea Nitrogen 22 6 - 23 mg/dL    Creatinine 0.88 0.50 - 1.05 mg/dL    eGFR 90 >60 mL/min/1.73m*2    Calcium 9.0 8.6 - 10.3 mg/dL   CBC   Result Value Ref Range    WBC 7.3 4.4 - 11.3 x10*3/uL    nRBC 0.0 0.0 - 0.0 /100 WBCs    RBC 3.35 (L) 4.00 - 5.20 x10*6/uL    Hemoglobin 10.8 (L) 12.0 - 16.0 g/dL    Hematocrit 34.2 (L) 36.0 - 46.0 %     (H) 80 - 100 fL    MCH 32.2 26.0 - 34.0 pg    MCHC 31.6 (L) 32.0 - 36.0 g/dL    RDW 14.8 (H) 11.5 - 14.5 %    Platelets 400 150 - 450 x10*3/uL       This patient has a urinary catheter   Reason for the urinary catheter remaining today? urinary retention/bladder outlet obstruction, acute or chronic               Assessment/Plan   Assessment & Plan  Weakness    Acute urinary retention      29-year-old female with  Recent history of left hip surgery with failed hardware, possible new fracture  Postop seroma  Down syndrome  Previous cellulitis treated with antibiotics status post Bactrim  Anemia  Generalized weakness, deconditioning  Ambulatory dysfunction  Urinary retention status post Dempsey  Possible cystitis, status post antibiotics  Plan  Case was discussed with Ortho surgery  Recommend DC to SNF  And Ortho will coordinate care with patient's family to arrange surgical intervention in near future  Dr. Tate plans on following up with patient in coordination with family to set up surgery  Pain control optimally with Tylenol, lidocaine patch, ibuprofen and minimal use of narcotics tramadol if  required  Family prefers to control pain with nonnarcotic medicines as preferred choice first  Follow objective pain assessment scales  Hydrate  Encourage nutrition  Continue aspirin 325 mg for DVT prophylaxis  Montelukast  Watch polypharmacy  Continue psych medicines, follow mentation  Currently hemodynamically stable maintaining vital saturations  DC to SNF when arrangements are made by case management and follow closely with orthopedic surgery                                                 Valerie Severino MD

## 2024-11-17 NOTE — SIGNIFICANT EVENT
"  Patient has been screaming and moaning as of around 9:30 PM.  I did pass by the patient and checked her out.  I tried to understand more why she is screaming and moaning.  But she is not verbalizing very well.  She did point to her left lower extremity where her fracture in her left hip is.  So I requested from the nurse to give the patient the pain medications.  Nurse told me that patient's sister/POA has not been allowing the providers to give the patient any form of opioids.  While reviewing the patient chart, I noticed that she is actually on temazepam and Ativan.  These are scheduled.  I also noticed that she is on tramadol as needed and Roxanol as needed.  Tramadol was given around 10 minutes ago.  I went ahead and called the patient's sister/POA.  Her name is Svetlana.  I had with me at the nursing station the patient STNAs. I introduced myself.  I explained to the patient's sister that patient is moaning and screaming and unless proven otherwise, and based on my clinical judgment, these are signs and symptoms consistent with pain and suffering..  I told the sister that I will need to act in the best interest of the patient and relieve suffering and if the tramadol does not relieve the pain I will have to proceed with morphine.  Sister said that she is not happy with my attitude and started using the F word.  She told me \"we need to figure out why she is moaning and she is in pain and do something about it.\"  I told her that because her hip is fractured.  And this causes a lot of pain.  I again reiterated, that I need to act in the best interest of the patient.  She told me that \" I do not know anything about the patient and that in the past, they were giving her opioids to an extent that she became addicted to it.\"  I told sister that I am surprised that she used the word \"addiction\" as addiction is a behavior and I am not sure how a person with no decision-making capacity can become addicted to a substance. "  I would understand if she would say dependence or tolerance but I am not aware about an individual with no decision-making capacity who can decide to become addicted on a substance.  The sister decided to go after that in a large argument with me so I shifted my focus again in the direction of the patient and I told her that I am not here to argue with her but to take care of the patient.  And while I was having this discussion over the phone, the patient was actually still moaning and yelling.  Patient's sister/POA threatened me and said that she is coming over to the hospital and to face me in person.  So I went ahead and informed the hospital security about that.  I informed the sister that this incident will  be brought up to the hospital ethics committee and even APS if needed.

## 2024-11-17 NOTE — PROGRESS NOTES
Physical Therapy                 Therapy Communication Note    Patient Name: Dee Jacob  MRN: [id number]  Department: [facility]  Room: [facility]  Today's Date: 11/17/2024     Discipline: Physical Therapy    Missed Visit Reason:      Missed Time: Attempt    Comment: PT treatment attempted 2 x today. In the morning and after lunch. Consult with Haley Alas RN, bedside nurse regarding patient refusal. Haley stated Dee did not sleep last night d/t high pain levels In her hip and they are using a jeferson lift to transfer patient to/from chair to bed.

## 2024-11-17 NOTE — SIGNIFICANT EVENT
Pt did have redness noted in folds legs and guillermo area. Just finishing the task in [employer] for admission.

## 2024-11-18 VITALS
SYSTOLIC BLOOD PRESSURE: 106 MMHG | HEIGHT: 58 IN | HEART RATE: 116 BPM | OXYGEN SATURATION: 94 % | RESPIRATION RATE: 20 BRPM | TEMPERATURE: 96.1 F | WEIGHT: 185 LBS | DIASTOLIC BLOOD PRESSURE: 78 MMHG | BODY MASS INDEX: 38.83 KG/M2

## 2024-11-18 LAB
ANION GAP SERPL CALC-SCNC: 11 MMOL/L (ref 10–20)
BUN SERPL-MCNC: 23 MG/DL (ref 6–23)
CALCIUM SERPL-MCNC: 8.7 MG/DL (ref 8.6–10.3)
CHLORIDE SERPL-SCNC: 102 MMOL/L (ref 98–107)
CO2 SERPL-SCNC: 28 MMOL/L (ref 21–32)
CREAT SERPL-MCNC: 0.87 MG/DL (ref 0.5–1.05)
EGFRCR SERPLBLD CKD-EPI 2021: >90 ML/MIN/1.73M*2
ERYTHROCYTE [DISTWIDTH] IN BLOOD BY AUTOMATED COUNT: 15 % (ref 11.5–14.5)
GLUCOSE SERPL-MCNC: 83 MG/DL (ref 74–99)
HCT VFR BLD AUTO: 32.7 % (ref 36–46)
HGB BLD-MCNC: 10.4 G/DL (ref 12–16)
MCH RBC QN AUTO: 32.6 PG (ref 26–34)
MCHC RBC AUTO-ENTMCNC: 31.8 G/DL (ref 32–36)
MCV RBC AUTO: 103 FL (ref 80–100)
NRBC BLD-RTO: 0 /100 WBCS (ref 0–0)
PLATELET # BLD AUTO: 404 X10*3/UL (ref 150–450)
POTASSIUM SERPL-SCNC: 3.8 MMOL/L (ref 3.5–5.3)
RBC # BLD AUTO: 3.19 X10*6/UL (ref 4–5.2)
SODIUM SERPL-SCNC: 137 MMOL/L (ref 136–145)
WBC # BLD AUTO: 6.4 X10*3/UL (ref 4.4–11.3)

## 2024-11-18 PROCEDURE — 2500000004 HC RX 250 GENERAL PHARMACY W/ HCPCS (ALT 636 FOR OP/ED): Performed by: HOSPITALIST

## 2024-11-18 PROCEDURE — 36415 COLL VENOUS BLD VENIPUNCTURE: CPT | Performed by: HOSPITALIST

## 2024-11-18 PROCEDURE — 2500000002 HC RX 250 W HCPCS SELF ADMINISTERED DRUGS (ALT 637 FOR MEDICARE OP, ALT 636 FOR OP/ED): Performed by: HOSPITALIST

## 2024-11-18 PROCEDURE — 2500000001 HC RX 250 WO HCPCS SELF ADMINISTERED DRUGS (ALT 637 FOR MEDICARE OP): Performed by: HOSPITALIST

## 2024-11-18 PROCEDURE — 1100000001 HC PRIVATE ROOM DAILY

## 2024-11-18 PROCEDURE — 2500000005 HC RX 250 GENERAL PHARMACY W/O HCPCS: Performed by: HOSPITALIST

## 2024-11-18 PROCEDURE — 80048 BASIC METABOLIC PNL TOTAL CA: CPT | Performed by: HOSPITALIST

## 2024-11-18 PROCEDURE — 2500000001 HC RX 250 WO HCPCS SELF ADMINISTERED DRUGS (ALT 637 FOR MEDICARE OP): Performed by: INTERNAL MEDICINE

## 2024-11-18 PROCEDURE — 85027 COMPLETE CBC AUTOMATED: CPT | Performed by: HOSPITALIST

## 2024-11-18 PROCEDURE — 99239 HOSP IP/OBS DSCHRG MGMT >30: CPT | Performed by: STUDENT IN AN ORGANIZED HEALTH CARE EDUCATION/TRAINING PROGRAM

## 2024-11-18 RX ORDER — ASPIRIN 325 MG
325 TABLET ORAL 2 TIMES DAILY
COMMUNITY
Start: 2024-11-18

## 2024-11-18 RX ORDER — FERROUS SULFATE 325(65) MG
65 TABLET ORAL
COMMUNITY
Start: 2024-11-19

## 2024-11-18 RX ORDER — LEVOTHYROXINE SODIUM 100 UG/1
100 TABLET ORAL
Status: DISCONTINUED | OUTPATIENT
Start: 2024-11-19 | End: 2024-11-19 | Stop reason: HOSPADM

## 2024-11-18 RX ORDER — POLYETHYLENE GLYCOL 3350 17 G/17G
17 POWDER, FOR SOLUTION ORAL DAILY
COMMUNITY
Start: 2024-11-19

## 2024-11-18 ASSESSMENT — COGNITIVE AND FUNCTIONAL STATUS - GENERAL
MOBILITY SCORE: 8
MOVING TO AND FROM BED TO CHAIR: TOTAL
CLIMB 3 TO 5 STEPS WITH RAILING: TOTAL
TOILETING: TOTAL
STANDING UP FROM CHAIR USING ARMS: TOTAL
STANDING UP FROM CHAIR USING ARMS: TOTAL
MOVING FROM LYING ON BACK TO SITTING ON SIDE OF FLAT BED WITH BEDRAILS: TOTAL
WALKING IN HOSPITAL ROOM: TOTAL
MOVING TO AND FROM BED TO CHAIR: TOTAL
WALKING IN HOSPITAL ROOM: TOTAL
TURNING FROM BACK TO SIDE WHILE IN FLAT BAD: TOTAL
DRESSING REGULAR UPPER BODY CLOTHING: TOTAL
CLIMB 3 TO 5 STEPS WITH RAILING: TOTAL
EATING MEALS: A LOT
DRESSING REGULAR LOWER BODY CLOTHING: TOTAL
MOBILITY SCORE: 6
HELP NEEDED FOR BATHING: TOTAL
MOVING FROM LYING ON BACK TO SITTING ON SIDE OF FLAT BED WITH BEDRAILS: A LOT
PERSONAL GROOMING: TOTAL
TURNING FROM BACK TO SIDE WHILE IN FLAT BAD: A LOT
DAILY ACTIVITIY SCORE: 7

## 2024-11-18 ASSESSMENT — PAIN - FUNCTIONAL ASSESSMENT
PAIN_FUNCTIONAL_ASSESSMENT: 0-10

## 2024-11-18 ASSESSMENT — PAIN DESCRIPTION - ORIENTATION: ORIENTATION: LEFT

## 2024-11-18 ASSESSMENT — PAIN DESCRIPTION - LOCATION
LOCATION: HIP
LOCATION: BACK

## 2024-11-18 ASSESSMENT — PAIN SCALES - GENERAL
PAINLEVEL_OUTOF10: 3
PAINLEVEL_OUTOF10: 5 - MODERATE PAIN
PAINLEVEL_OUTOF10: 0 - NO PAIN
PAINLEVEL_OUTOF10: 5 - MODERATE PAIN
PAINLEVEL_OUTOF10: 0 - NO PAIN

## 2024-11-18 ASSESSMENT — PAIN DESCRIPTION - DESCRIPTORS
DESCRIPTORS: ACHING
DESCRIPTORS: ACHING

## 2024-11-18 NOTE — PROGRESS NOTES
Dee Jacob is a 29 y.o. female on day 4 of admission presenting with Weakness.      Subjective   This is the first day that I am seeing the patient.  She has been managed in the hospital for hardware failure of left hip repair.  I was updated in the afternoon that the patient's family was in agreement for the patient to have surgery.  Orthopedist, Dr. Odonnell, I contacted us and stated that he would except the patient on his service at [facility].       Objective     Last Recorded Vitals  /86 (BP Location: Left arm, Patient Position: Lying)   Pulse 87   Temp 36.4 °C (97.5 °F) (Temporal)   Resp 20   Wt 83.9 kg (185 lb)   SpO2 95%   Intake/Output last 3 Shifts:    Intake/Output Summary (Last 24 hours) at 11/18/2024 1650  Last data filed at 11/18/2024 1300  Gross per 24 hour   Intake 2860 ml   Output 1100 ml   Net 1760 ml       Admission Weight  Weight: 83.9 kg (185 lb) (11/12/24 1328)    Daily Weight  11/12/24 : 83.9 kg (185 lb)    Image Results  CT lumbar spine wo IV contrast  Narrative: Interpreted By:  Charly Hearn,   STUDY:  L spine  CT LUMBAR SPINE WO IV CONTRAST;  11/13/2024 6:47 pm      INDICATION:  Signs/Symptoms:pain.      COMPARISON:  None.      ACCESSION NUMBER(S):  [id number]      ORDERING CLINICIAN:  KEMAR WHITLEY      TECHNIQUE:  CT examination was performed throughout the lumbar spine. Multiplanar  reconstructions were made.      FINDINGS:  Alignment and vertebral body height are normal. Bone density is  normal. There is no evidence of canal or foraminal narrowing.      At L3/L4, there are minor degenerative endplate changes to the left  of midline including Schmorl's node formation, narrowing of the  intervertebral disc space and bony sclerosis. Facet joints are  normal. Visualized sacrum is normal. Visualized paraspinal soft  tissues are normal.      Impression: Minor discogenic changes at L3/L4      No evidence of bony canal stenosis or bony foraminal narrowing.       No evidence of fracture or subluxation. No evidence of benign or  malignant neoplasm.      MACRO:  none      Signed by: Charly Hearn 11/14/2024 8:37 AM  Dictation workstation:   [id number]  XR hip left with pelvis when performed 2 or 3 views  Narrative: Interpreted By:  Jayden Vazquez,   STUDY:  XR HIP LEFT WITH PELVIS WHEN PERFORMED 2 OR 3 VIEWS; ;  11/14/2024  8:01 am      INDICATION:  Signs/Symptoms:pain.          COMPARISON:  11/12/2024      ACCESSION NUMBER(S):  [id number]      ORDERING CLINICIAN:  KEMAR WHITLEY      FINDINGS:  PELVIS AP, LEFT HIP AP AND LATERAL  No change is seen, in the position of the comminuted fracture  fragments in the intertrochanteric region. Callus formation is  present. The femoral shaft is displaced laterally and anteriorly in  relation to the femoral neck. The lateral view is suboptimal. The  intertrochanteric compression screw is intact. The lateral plate  along the femoral proximal shaft is displaced slightly laterally but  unchanged from 11/12/2024. The pelvic bones and right hip joint are  intact.      Impression: Comminuted intertrochanteric fracture of the left femur, with  hardware present. Callus formation seen but new fracture superimposed  on old fracture is not excluded. No change from 11/12/2024.          MACRO:  None      Signed by: Jayden Vazquez 11/14/2024 8:19 AM  Dictation workstation:   [id number]      Physical Exam    Relevant Results               Assessment/Plan                  Assessment & Plan  Weakness    Acute urinary retention    1.  Left hip fracture: Patient had recent repair of this at [facility].  It appears she now has had hardware failure with further fracture is nonunited.  Patient will need further surgery to repair this.  She will not be able to bear weight on this hip until it is.    2.  Postop seroma: Patient completed antibiotics with Bactrim.    3.  Cellulitis: Patient completed antibiotics with Bactrim.    4.  Urinary  retention: Dempsey catheter in place.    5.  Down syndrome    Disposition: Patient will be transferred to [facility].  Discharge orders have been placed.                  Kiran Gary,

## 2024-11-18 NOTE — PROGRESS NOTES
Pt reviewed during Care Rounds today and she will be transferred to [facility] for an orthopedic surgery by Dr. Odonnell.  Call placed to pt's sister/Svetlana with an update.  Care Transitions will follow as needed.       BOB Ochoa

## 2024-11-18 NOTE — PROGRESS NOTES
Occupational Therapy                 Therapy Communication Note    Patient Name: Dee Jacob  MRN: [id number]  Department: [facility]  Room: [address] [place], [place] [address]  Today's Date: 11/18/2024   1147am    Discipline: Occupational Therapy    Missed Visit Reason: Missed Visit Reason: Other (Comment) (attempted session.  pt in bed and is fatigued and unable to stay awake for conversation.  pt unable to follow commands due to fatigue.   per chart review pt appeared to have significant amount of pain over the weekend.  will hold OT session for today and re-attempt as pt is able)

## 2024-11-18 NOTE — CARE PLAN
Problem: Nutrition  Goal: Oral intake greater 75%  Outcome: Progressing  Goal: Consume prescribed supplement  Outcome: Progressing  Goal: BG  mg/dL  Outcome: Progressing  Goal: Lab values WNL  Outcome: Progressing  Goal: Electrolytes WNL  Outcome: Progressing  Goal: Promote healing  Outcome: Progressing  Goal: Maintain stable weight  Outcome: Progressing     Problem: Skin  Goal: Decreased wound size/increased tissue granulation at next dressing change  Outcome: Progressing  Flowsheets (Taken 11/18/2024 1109)  Decreased wound size/increased tissue granulation at next dressing change: Promote sleep for wound healing  Goal: Participates in plan/prevention/treatment measures  Outcome: Progressing  Flowsheets (Taken 11/18/2024 1109)  Participates in plan/prevention/treatment measures: Increase activity/out of bed for meals  Goal: Prevent/manage excess moisture  Outcome: Progressing  Flowsheets (Taken 11/18/2024 1109)  Prevent/manage excess moisture: Moisturize dry skin  Goal: Prevent/minimize sheer/friction injuries  Outcome: Progressing  Flowsheets (Taken 11/18/2024 1109)  Prevent/minimize sheer/friction injuries: Use pull sheet  Goal: Promote/optimize nutrition  Outcome: Progressing  Flowsheets (Taken 11/18/2024 1109)  Promote/optimize nutrition: Consume > 50% meals/supplements  Goal: Promote skin healing  Outcome: Progressing  Flowsheets (Taken 11/18/2024 1109)  Promote skin healing: Turn/reposition every 2 hours/use positioning/transfer devices     Problem: Pain  Goal: Performs ADL's with improved pain control throughout shift  Outcome: Progressing  Goal: Participates in PT with improved pain control throughout the shift  Outcome: Progressing  Goal: Free from opioid side effects throughout the shift  Outcome: Progressing  Goal: Free from acute confusion related to pain meds throughout the shift  Outcome: Progressing     Problem: Discharge Planning  Goal: Discharge to home or other facility with appropriate  resources  Outcome: Progressing   The patient's goals for the shift include use the call light    The clinical goals for the shift include no falls    Over the shift, the patient did not make progress toward the following goals. Barriers to progression include . Recommendations to address these barriers include .

## 2024-11-18 NOTE — DISCHARGE SUMMARY
Discharge Diagnosis  Weakness    Issues Requiring Follow-Up  Left hip fracture    Discharge Meds     Medication List      START taking these medications     ferrous sulfate (325 mg ferrous sulfate) tablet; Start taking on:   November 19, 2024   polyethylene glycol 17 gram packet; Commonly known as: Glycolax,   Miralax; Start taking on: November 19, 2024     CONTINUE taking these medications     ammonium lactate 12 % cream; Commonly known as: Amlactin   ARIPiprazole 5 mg tablet; Commonly known as: Abilify   aspirin 325 mg tablet   celecoxib 200 mg capsule; Commonly known as: CeleBREX   clobetasoL 0.05 % shampoo   DULoxetine 60 mg DR capsule; Commonly known as: Cymbalta   fluticasone 50 mcg/actuation nasal spray; Commonly known as: Flonase   levocetirizine 5 mg tablet; Commonly known as: Xyzal   levothyroxine 100 mcg tablet; Commonly known as: Synthroid, Levoxyl   LORazepam 1 mg tablet; Commonly known as: Ativan   montelukast 10 mg tablet; Commonly known as: Singulair   olopatadine 0.1 % ophthalmic solution; Commonly known as: Patanol   temazepam 15 mg capsule; Commonly known as: Restoril   traMADol 50 mg tablet; Commonly known as: Ultram     STOP taking these medications     ibuprofen 400 mg tablet   sulfamethoxazole-trimethoprim 800-160 mg tablet; Commonly known as:   Bactrim DS       Test Results Pending At Discharge  Pending Labs       No current pending labs.            Hospital Course   29 y.o. female presenting with generalized weakness.  Per home health nurse not safe to be at home.  She had recent left hip surgery 4 weeks ago walking fine with a walker but recently developed tremors and supposed to see neurology for that reason.  She has developed deconditioning weakness and needs higher level of care likely placement in skilled nursing rehab no nausea vomiting diarrhea or fever chills  No bleeding.  Patient unable to give a history due to Down syndrome.  Unable to communicate.  She is unable to bear weight  on her postop hip as well and affected hip.  She does not have assistive devices at home to help her with ambulation.  She has been in distress for pain.  Caregiver concerned about bedsore and she has been on antibiotics for possible infection in skin.  Further history limited at this time    During hospital stay, patient would complete antibiotics for cellulitis.  There was an issue in regards to family not wanting the patient to have further surgery.  The patient's sister would eventually agreed to moving forward with surgery and the patient would be transferred to [facility] under the service of orthopedists, Dr. Odonnell, in order to have further repair of her hip.  Further management will be done at that facility.    Pertinent Physical Exam At Time of Discharge  General Appearance: AAO x0  Skin: skin color pink, warm, and dry; no suspicious rashes or lesions  Eyes : PERRL, EOM's intact  ENT: mucous membranes pink and moist  Neck: normocephalic  Respiratory: lungs clear to auscultation anteriorly; no wheezing, rhonchi, or crackles.   Heart: regular rate and rhythm.   Abdomen: Nondistended, positive bowel sounds x4, soft,  nontender  Extremities: no edema, not cooperative to test range of motion extremities  Peripheral pulses: normal x4 extremities  Neuro: Awake, cannot follow commands    Outpatient Follow-Up  No future appointments.      Kiran Gary,

## 2024-11-18 NOTE — PROGRESS NOTES
Physical Therapy                 Therapy Communication Note    Patient Name: Dee Jacob  MRN: [id number]  Department: [facility]  Room: [address] [place], [place] [address]  Today's Date: 11/18/2024     Discipline: Physical Therapy    Missed Visit Reason: Missed Visit Reason:  (attempted session. pt in bed  fatigued & unable to stay awake for conversation. pt unable to follow commands due to fatigue. per chart review pt appeared to have significant amount of pain over the weekend & nsg has been using jeferson. will hold PT  today)    Missed Time: Attempt    Comment: